# Patient Record
Sex: MALE | Race: WHITE | Employment: OTHER | URBAN - METROPOLITAN AREA
[De-identification: names, ages, dates, MRNs, and addresses within clinical notes are randomized per-mention and may not be internally consistent; named-entity substitution may affect disease eponyms.]

---

## 2018-09-11 ENCOUNTER — HOSPITAL ENCOUNTER (OUTPATIENT)
Dept: SURGERY | Age: 73
Discharge: HOME | End: 2018-09-11
Payer: MEDICARE

## 2018-09-11 ENCOUNTER — HOSPITAL ENCOUNTER (OUTPATIENT)
Dept: PHYSICAL THERAPY | Age: 73
Discharge: HOME | End: 2018-09-11
Payer: MEDICARE

## 2018-09-11 LAB
ALBUMIN SERPL-MCNC: 3.7 G/DL (ref 3.2–4.6)
ANION GAP SERPL CALC-SCNC: 7 MMOL/L
APPEARANCE UR: CLEAR
APTT PPP: 29.8 SEC (ref 23.2–35.3)
ATRIAL RATE: 60 BPM
BACTERIA SPEC CULT: NORMAL
BASOPHILS # BLD: 0 K/UL (ref 0–0.2)
BASOPHILS NFR BLD: 0 % (ref 0–2)
BILIRUB UR QL: NEGATIVE
BUN SERPL-MCNC: 16 MG/DL (ref 8–23)
CALCIUM SERPL-MCNC: 9.4 MG/DL (ref 8.3–10.4)
CALCULATED P AXIS, ECG09: 56 DEGREES
CALCULATED R AXIS, ECG10: 24 DEGREES
CALCULATED T AXIS, ECG11: 2 DEGREES
CHLORIDE SERPL-SCNC: 101 MMOL/L (ref 98–107)
CO2 SERPL-SCNC: 32 MMOL/L (ref 21–32)
COLOR UR: YELLOW
CREAT SERPL-MCNC: 0.88 MG/DL (ref 0.8–1.5)
DIAGNOSIS, 93000: NORMAL
DIFFERENTIAL METHOD BLD: NORMAL
EOSINOPHIL # BLD: 0.2 K/UL (ref 0–0.8)
EOSINOPHIL NFR BLD: 3 % (ref 0.5–7.8)
ERYTHROCYTE [DISTWIDTH] IN BLOOD BY AUTOMATED COUNT: 12.9 %
EST. AVERAGE GLUCOSE BLD GHB EST-MCNC: 137 MG/DL
GLUCOSE SERPL-MCNC: 109 MG/DL (ref 65–100)
GLUCOSE UR STRIP.AUTO-MCNC: NEGATIVE MG/DL
HBA1C MFR BLD: 6.4 %
HCT VFR BLD AUTO: 43 % (ref 41.1–50.3)
HGB BLD-MCNC: 13.6 G/DL (ref 13.6–17.2)
HGB UR QL STRIP: NEGATIVE
IMM GRANULOCYTES # BLD: 0 K/UL (ref 0–0.5)
IMM GRANULOCYTES NFR BLD AUTO: 0 % (ref 0–5)
INR PPP: 1
KETONES UR QL STRIP.AUTO: ABNORMAL MG/DL
LEUKOCYTE ESTERASE UR QL STRIP.AUTO: NEGATIVE
LYMPHOCYTES # BLD: 1.5 K/UL (ref 0.5–4.6)
LYMPHOCYTES NFR BLD: 22 % (ref 13–44)
MCH RBC QN AUTO: 30 PG (ref 26.1–32.9)
MCHC RBC AUTO-ENTMCNC: 31.6 G/DL (ref 31.4–35)
MCV RBC AUTO: 94.7 FL (ref 79.6–97.8)
MONOCYTES # BLD: 0.7 K/UL (ref 0.1–1.3)
MONOCYTES NFR BLD: 10 % (ref 4–12)
NEUTS SEG # BLD: 4.5 K/UL (ref 1.7–8.2)
NEUTS SEG NFR BLD: 65 % (ref 43–78)
NITRITE UR QL STRIP.AUTO: NEGATIVE
NRBC # BLD: 0 K/UL (ref 0–0.2)
P-R INTERVAL, ECG05: 178 MS
PH UR STRIP: 5.5 [PH] (ref 5–9)
PLATELET # BLD AUTO: 168 K/UL (ref 150–450)
PMV BLD AUTO: 10.1 FL (ref 9.4–12.3)
POTASSIUM SERPL-SCNC: 4.1 MMOL/L (ref 3.5–5.1)
PROT UR STRIP-MCNC: NEGATIVE MG/DL
PROTHROMBIN TIME: 12.8 SEC (ref 11.5–14.5)
Q-T INTERVAL, ECG07: 442 MS
QRS DURATION, ECG06: 158 MS
QTC CALCULATION (BEZET), ECG08: 442 MS
RBC # BLD AUTO: 4.54 M/UL (ref 4.23–5.6)
SERVICE CMNT-IMP: NORMAL
SODIUM SERPL-SCNC: 140 MMOL/L (ref 136–145)
SP GR UR REFRACTOMETRY: 1.03 (ref 1–1.02)
UROBILINOGEN UR QL STRIP.AUTO: 0.2 EU/DL (ref 0.2–1)
VENTRICULAR RATE, ECG03: 60 BPM
WBC # BLD AUTO: 7 K/UL (ref 4.3–11.1)

## 2018-09-11 PROCEDURE — G8979 MOBILITY GOAL STATUS: HCPCS

## 2018-09-11 PROCEDURE — 81003 URINALYSIS AUTO W/O SCOPE: CPT

## 2018-09-11 PROCEDURE — 83036 HEMOGLOBIN GLYCOSYLATED A1C: CPT

## 2018-09-11 PROCEDURE — 85730 THROMBOPLASTIN TIME PARTIAL: CPT

## 2018-09-11 PROCEDURE — 87641 MR-STAPH DNA AMP PROBE: CPT

## 2018-09-11 PROCEDURE — 85610 PROTHROMBIN TIME: CPT

## 2018-09-11 PROCEDURE — 97161 PT EVAL LOW COMPLEX 20 MIN: CPT

## 2018-09-11 PROCEDURE — 82040 ASSAY OF SERUM ALBUMIN: CPT

## 2018-09-11 PROCEDURE — G8978 MOBILITY CURRENT STATUS: HCPCS

## 2018-09-11 PROCEDURE — 36415 COLL VENOUS BLD VENIPUNCTURE: CPT

## 2018-09-11 PROCEDURE — 77030027138 HC INCENT SPIROMETER -A

## 2018-09-11 PROCEDURE — 80048 BASIC METABOLIC PNL TOTAL CA: CPT

## 2018-09-11 PROCEDURE — 93005 ELECTROCARDIOGRAM TRACING: CPT | Performed by: ORTHOPAEDIC SURGERY

## 2018-09-11 PROCEDURE — 85025 COMPLETE CBC W/AUTO DIFF WBC: CPT

## 2018-09-11 PROCEDURE — 80307 DRUG TEST PRSMV CHEM ANLYZR: CPT

## 2018-09-11 PROCEDURE — G8980 MOBILITY D/C STATUS: HCPCS

## 2018-09-11 RX ORDER — PRAVASTATIN SODIUM 40 MG/1
40 TABLET ORAL
COMMUNITY

## 2018-09-11 RX ORDER — HYDROCODONE BITARTRATE AND ACETAMINOPHEN 10; 325 MG/1; MG/1
1 TABLET ORAL 3 TIMES DAILY
Status: ON HOLD | COMMUNITY
End: 2018-10-04

## 2018-09-11 RX ORDER — CELECOXIB 200 MG/1
200 CAPSULE ORAL DAILY
COMMUNITY

## 2018-09-11 RX ORDER — MULTIVITAMIN
1 TABLET ORAL DAILY
COMMUNITY

## 2018-09-11 RX ORDER — LANOLIN ALCOHOL/MO/W.PET/CERES
1000 CREAM (GRAM) TOPICAL DAILY
COMMUNITY

## 2018-09-11 RX ORDER — RANITIDINE 150 MG/1
150 CAPSULE ORAL 2 TIMES DAILY
COMMUNITY

## 2018-09-11 RX ORDER — GABAPENTIN 600 MG/1
600 TABLET ORAL 3 TIMES DAILY
COMMUNITY

## 2018-09-11 RX ORDER — IBUPROFEN 200 MG
TABLET ORAL
COMMUNITY
End: 2018-10-05

## 2018-09-11 RX ORDER — METFORMIN HYDROCHLORIDE 500 MG/1
TABLET, FILM COATED, EXTENDED RELEASE ORAL DAILY
COMMUNITY

## 2018-09-11 RX ORDER — DOCUSATE SODIUM 100 MG/1
100 CAPSULE, LIQUID FILLED ORAL
COMMUNITY

## 2018-09-11 NOTE — PROGRESS NOTES
Pati Jackson : [birth year](78 y.o.) [address] at [place] [address]. [phone] Physical Therapy Prehab Plan of Treatment and Evaluation Summary:2018 ICD-10: Treatment Diagnosis:  
· Pain in left hip (M25.552) · Stiffness of Left Hip, Not elsewhere classified (M25.652) Precautions/Allergies:  
Review of patient's allergies indicates no known allergies. MEDICAL/REFERRING DIAGNOSIS: 
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 
DATE OF SURGERY: 10/3/18 Assessment:  
Comments:  Pain in left hip joint with decreased independence with functional mobility. Pt plans to pursue rehab following hospital stay. Pt's wife and daughter present. Pt's wife will be recovering from surgery as well as pt. PROBLEM LIST (Impacting functional limitations): 
Mr. Fabián Ramirez presents with the following left lower extremity(s) problems: 1. Transfers 2. Gait 3. Strength 4. Range of Motion 5. Pain INTERVENTIONS PLANNED: 
1. Home Exercise Program 
2. Educational Discussion TREATMENT PLAN: Effective Dates: 2018 TO 2018. Frequency/Duration: Patient to continue to perform home exercise program at least twice per day up until his surgery. GOALS: (Goals have been discussed and agreed upon with patient.) Discharge Goals: Time Frame: 1 Day 1. Patient will demonstrate independence with a home exercise program designed to increase strength, range of motion and pain control to minimize functional deficits and optimize patient for total joint replacement. Rehabilitation Potential For Stated Goals: Good Regarding Zeke Escalante's therapy, I certify that the treatment plan above will be carried out by a therapist or under their direction. Thank you for this referral, Yuliet Roca, PT     
    
 
 
HISTORY:  
Present Symptoms: 
Pain Intensity 1: 8 Pain Location 1: Hip Pain Orientation 1: Left History of Present Injury/Illness (Reason for Referral): 
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  
Mr. Feroz Hough  has a past medical history of Cancer ([facility] [address] Chronic pain; Constipation; DDD (degenerative disc disease), lumbar; Diabetes ([facility] [address] GERD (gastroesophageal reflux disease); Hypercholesteremia; Kidney stones; Nausea & vomiting; Scoliosis; and Spinal stenosis. He also has no past medical history of Adverse effect of anesthesia; Difficult intubation; Malignant hyperthermia due to anesthesia; or Pseudocholinesterase deficiency. Mr. Feroz Hough  has a past surgical history that includes hx hernia repair; hx urological; hx other surgical; and hx other surgical. 
Social History/Living Environment:  
Home Environment: Private residence # Steps to Enter: 6 One/Two Story Residence: One story Living Alone: No 
Support Systems: Spouse/Significant Other/Partner Patient Expects to be Discharged to[de-identified] Private residence Current DME Used/Available at Home: Cane, straight, Walker, rolling Tub or Shower Type: Shower Work/Activity: 
retired Dominant Side: 
RIGHT Current Medications:  See Pre-assessment nursing note Number of Personal Factors/Comorbidities that affect the Plan of Care: 0: LOW COMPLEXITY EXAMINATION:  
ADLs (Current Functional Status):  
Ambulation: 
[] Independent 
[] Walk Indoors Only 
[] Walk Outdoors [x] Use Assistive Device cane and wheelchair 
[] Use Wheelchair Only Dressing: 
[x] Independent Requires Assistance from Someone for: 
[] Sock/Shoes 
[] Pants 
[] Everything Bathing/Showering:  
[x] Independent 
[] Requires Assistance from Someone 
[] Sponge Bath Only Household Activities: 
[] Routine house and yard work [x] Light Housework Only 
[] None Observation/Orthostatic Postural Assessment:  
Within defined limits ROM/Flexibility:  
AROM: Generally decreased, functional 
 
  
  
  
  
LLE AROM L Hip Flexion: 90   
 
RLE Assessment RLE Assessment (WDL): Within defined limits Strength:  
Strength: Generally decreased, functional 
 
  
    
 
   
Functional Mobility:   
Coordination: Generally decreased, functional 
 
Gait Description (WDL): Within defined limits Stand to Sit: Independent Sit to Stand: Independent Distance (ft): 0 Feet (ft) Gait Abnormalities: Antalgic Balance:   
Sitting: Intact Standing: Intact Body Structures Involved: 1. Bones 2. Joints 3. Muscles 4. Ligaments Body Functions Affected: 1. Movement Related 2. Skin Related Activities and Participation Affected: 1. Mobility Number of elements that affect the Plan of Care: 4+: HIGH COMPLEXITY CLINICAL PRESENTATION:  
Presentation: Stable and uncomplicated: LOW COMPLEXITY CLINICAL DECISION MAKING:  
Outcome Measure: Tool Used: Lower Extremity Functional Scale (LEFS) Score:  Initial: 10/80 Most Recent: X/80 (Date: -- ) Interpretation of Score: 20 questions each scored on a 5 point scale with 0 representing \"extreme difficulty or unable to perform\" and 4 representing \"no difficulty\". The lower the score, the greater the functional disability. 80/80 represents no disability. Minimal detectable change is 9 points. Score 80 79-65 64-49 48-33 32-17 16-1 0 Modifier CH CI CJ CK CL CM CN  
 
? Mobility - Walking and Moving Around:  
  - CURRENT STATUS: CM - 80%-99% impaired, limited or restricted  - GOAL STATUS: CM - 80%-99% impaired, limited or restricted  - D/C STATUS:  CM - 80%-99% impaired, limited or restricted Medical Necessity:  
· Mr. Tay Polo is expected to optimize his lower extremity strength and ROM in preparation for joint replacement surgery. Reason for Services/Other Comments: · Achieve baseline assesment of musculoskeletal system, functional mobility and home environment. , educate in PT HEP in preparation for surgery, educate in hospital plan of care. Use of outcome tool(s) and clinical judgement create a POC that gives a: Clear prediction of patient's progress: LOW COMPLEXITY  
TREATMENT:  
Treatment/Session Assessment:  Patient was instructed in PT- HEP to increase strength and ROM in LEs. Answered all questions. · Post session pain:  8 
· Compliance with Program/Exercises: compliant most of the time. Total Treatment Duration: PT Patient Time In/Time Out Time In: 1230 Time Out: 1300 Jazmín Manning PT

## 2018-09-11 NOTE — PERIOP NOTES
Recent Results (from the past 12 hour(s)) CBC WITH AUTOMATED DIFF Collection Time: 09/11/18 12:35 PM  
Result Value Ref Range WBC 7.0 4.3 - 11.1 K/uL  
 RBC 4.54 4.23 - 5.6 M/uL  
 HGB 13.6 13.6 - 17.2 g/dL HCT 43.0 41.1 - 50.3 % MCV 94.7 79.6 - 97.8 FL  
 MCH 30.0 26.1 - 32.9 PG  
 MCHC 31.6 31.4 - 35.0 g/dL  
 RDW 12.9 % PLATELET 333 470 - 411 K/uL MPV 10.1 9.4 - 12.3 FL ABSOLUTE NRBC 0.00 0.0 - 0.2 K/uL  
 DF AUTOMATED NEUTROPHILS 65 43 - 78 % LYMPHOCYTES 22 13 - 44 % MONOCYTES 10 4.0 - 12.0 % EOSINOPHILS 3 0.5 - 7.8 % BASOPHILS 0 0.0 - 2.0 % IMMATURE GRANULOCYTES 0 0.0 - 5.0 %  
 ABS. NEUTROPHILS 4.5 1.7 - 8.2 K/UL  
 ABS. LYMPHOCYTES 1.5 0.5 - 4.6 K/UL  
 ABS. MONOCYTES 0.7 0.1 - 1.3 K/UL  
 ABS. EOSINOPHILS 0.2 0.0 - 0.8 K/UL  
 ABS. BASOPHILS 0.0 0.0 - 0.2 K/UL  
 ABS. IMM. GRANS. 0.0 0.0 - 0.5 K/UL PROTHROMBIN TIME + INR Collection Time: 09/11/18 12:35 PM  
Result Value Ref Range Prothrombin time 12.8 11.5 - 14.5 sec INR 1.0    
PTT Collection Time: 09/11/18 12:35 PM  
Result Value Ref Range aPTT 29.8 23.2 - 35.3 SEC METABOLIC PANEL, BASIC Collection Time: 09/11/18 12:35 PM  
Result Value Ref Range Sodium 140 136 - 145 mmol/L Potassium 4.1 3.5 - 5.1 mmol/L Chloride 101 98 - 107 mmol/L  
 CO2 32 21 - 32 mmol/L Anion gap 7 mmol/L Glucose 109 (H) 65 - 100 mg/dL BUN 16 8 - 23 MG/DL Creatinine 0.88 0.8 - 1.5 MG/DL  
 GFR est AA >60 >60 ml/min/1.73m2 GFR est non-AA >60 ml/min/1.73m2 Calcium 9.4 8.3 - 10.4 MG/DL URINALYSIS W/ RFLX MICROSCOPIC Collection Time: 09/11/18 12:35 PM  
Result Value Ref Range Color YELLOW Appearance CLEAR Specific gravity 1.026 (H) 1.001 - 1.023    
 pH (UA) 5.5 5.0 - 9.0 Protein NEGATIVE  NEG mg/dL Glucose NEGATIVE  mg/dL Ketone TRACE (A) NEG mg/dL Bilirubin NEGATIVE  NEG Blood NEGATIVE  NEG Urobilinogen 0.2 0.2 - 1.0 EU/dL  Nitrites NEGATIVE  NEG    
 Leukocyte Esterase NEGATIVE  NEG    
ALBUMIN Collection Time: 09/11/18 12:35 PM  
Result Value Ref Range Albumin 3.7 3.2 - 4.6 g/dL HEMOGLOBIN A1C WITH EAG Collection Time: 09/11/18 12:35 PM  
Result Value Ref Range Hemoglobin A1c 6.4 % Est. average glucose 137 mg/dL EKG, 12 LEAD, INITIAL Collection Time: 09/11/18 12:46 PM  
Result Value Ref Range Ventricular Rate 60 BPM  
 Atrial Rate 60 BPM  
 P-R Interval 178 ms QRS Duration 158 ms Q-T Interval 442 ms QTC Calculation (Bezet) 442 ms Calculated P Axis 56 degrees Calculated R Axis 24 degrees Calculated T Axis 2 degrees Diagnosis Normal sinus rhythm Right bundle branch block Abnormal ECG No previous ECGs available

## 2018-09-11 NOTE — PERIOP NOTES
Patient verified name, , and surgery as listed in [facility]. Pt reports very loose bottom front tooth, states no pain or signs of infection:pt is asking whether or not to pull tooth prior to surgery on 10-3-18. I notified Dr Eugenio Valencia, anesthesiologist of above and that pt concerned he will not be able to have spinal anesthesia r/t back problems and may need general.  Dr Eugenio Valencia reports he will probably be able to have spinal but to notify surgeon for recommendation. Called and left voice message for Allurent at Dr Sania Ayala office. Type 3 surgery, Joint assessment complete. Labs per surgeon: cbc,bmp,pt,ptt,ua ; results within anathesia limits. Labs per anesthesia protocol: Included in surgeon's orders. EKG: today - within anesthesia limits. Hibiclens and instructions to return bottle on DOS given per hospital policy. Nasal Swab collected per MD order and instructions for Mupirocin nasal ointment if required. Patient provided with handouts including Guide to Surgery, Pain Management, Hand Hygiene, Blood Transfusion Education, and [facility] Brochure. Patient answered medical/surgical history questions at their best of ability. All prior to admission medications documented in [facility]. Original medication prescription bottle  visualized during patient appointment. Patient instructed to hold all vitamins 7 days prior to surgery and NSAIDS 5 days prior to surgery. Medications to be held: ibuprofen- 5 days. Patient instructed to continue previous medications as prescribed prior to surgery and to take the following medications the day of surgery according to anesthesia guidelines with a small sip of water: oxycodone-acetaminophen, gabapentin, ranitidine. Brandan Herron Patient teach back successful and patient demonstrates knowledge of instruction.

## 2018-09-11 NOTE — PERIOP NOTES
Recent Results (from the past 12 hour(s)) CBC WITH AUTOMATED DIFF Collection Time: 09/11/18 12:35 PM  
Result Value Ref Range WBC 7.0 4.3 - 11.1 K/uL  
 RBC 4.54 4.23 - 5.6 M/uL  
 HGB 13.6 13.6 - 17.2 g/dL HCT 43.0 41.1 - 50.3 % MCV 94.7 79.6 - 97.8 FL  
 MCH 30.0 26.1 - 32.9 PG  
 MCHC 31.6 31.4 - 35.0 g/dL  
 RDW 12.9 % PLATELET 187 911 - 967 K/uL MPV 10.1 9.4 - 12.3 FL ABSOLUTE NRBC 0.00 0.0 - 0.2 K/uL  
 DF AUTOMATED NEUTROPHILS 65 43 - 78 % LYMPHOCYTES 22 13 - 44 % MONOCYTES 10 4.0 - 12.0 % EOSINOPHILS 3 0.5 - 7.8 % BASOPHILS 0 0.0 - 2.0 % IMMATURE GRANULOCYTES 0 0.0 - 5.0 %  
 ABS. NEUTROPHILS 4.5 1.7 - 8.2 K/UL  
 ABS. LYMPHOCYTES 1.5 0.5 - 4.6 K/UL  
 ABS. MONOCYTES 0.7 0.1 - 1.3 K/UL  
 ABS. EOSINOPHILS 0.2 0.0 - 0.8 K/UL  
 ABS. BASOPHILS 0.0 0.0 - 0.2 K/UL  
 ABS. IMM. GRANS. 0.0 0.0 - 0.5 K/UL PROTHROMBIN TIME + INR Collection Time: 09/11/18 12:35 PM  
Result Value Ref Range Prothrombin time 12.8 11.5 - 14.5 sec INR 1.0    
PTT Collection Time: 09/11/18 12:35 PM  
Result Value Ref Range aPTT 29.8 23.2 - 35.3 SEC METABOLIC PANEL, BASIC Collection Time: 09/11/18 12:35 PM  
Result Value Ref Range Sodium 140 136 - 145 mmol/L Potassium 4.1 3.5 - 5.1 mmol/L Chloride 101 98 - 107 mmol/L  
 CO2 32 21 - 32 mmol/L Anion gap 7 mmol/L Glucose 109 (H) 65 - 100 mg/dL BUN 16 8 - 23 MG/DL Creatinine 0.88 0.8 - 1.5 MG/DL  
 GFR est AA >60 >60 ml/min/1.73m2 GFR est non-AA >60 ml/min/1.73m2 Calcium 9.4 8.3 - 10.4 MG/DL URINALYSIS W/ RFLX MICROSCOPIC Collection Time: 09/11/18 12:35 PM  
Result Value Ref Range Color YELLOW Appearance CLEAR Specific gravity 1.026 (H) 1.001 - 1.023    
 pH (UA) 5.5 5.0 - 9.0 Protein NEGATIVE  NEG mg/dL Glucose NEGATIVE  mg/dL Ketone TRACE (A) NEG mg/dL Bilirubin NEGATIVE  NEG Blood NEGATIVE  NEG Urobilinogen 0.2 0.2 - 1.0 EU/dL  Nitrites NEGATIVE  NEG    
 Leukocyte Esterase NEGATIVE  NEG    
ALBUMIN Collection Time: 09/11/18 12:35 PM  
Result Value Ref Range Albumin 3.7 3.2 - 4.6 g/dL HEMOGLOBIN A1C WITH EAG Collection Time: 09/11/18 12:35 PM  
Result Value Ref Range Hemoglobin A1c 6.4 % Est. average glucose 137 mg/dL EKG, 12 LEAD, INITIAL Collection Time: 09/11/18 12:46 PM  
Result Value Ref Range Ventricular Rate 60 BPM  
 Atrial Rate 60 BPM  
 P-R Interval 178 ms QRS Duration 158 ms Q-T Interval 442 ms QTC Calculation (Bezet) 442 ms Calculated P Axis 56 degrees Calculated R Axis 24 degrees Calculated T Axis 2 degrees Diagnosis Normal sinus rhythm Right bundle branch block Abnormal ECG No previous ECGs available

## 2018-09-12 VITALS
BODY MASS INDEX: 23.95 KG/M2 | TEMPERATURE: 97.6 F | HEIGHT: 66 IN | SYSTOLIC BLOOD PRESSURE: 145 MMHG | HEART RATE: 75 BPM | DIASTOLIC BLOOD PRESSURE: 75 MMHG | RESPIRATION RATE: 18 BRPM | WEIGHT: 149 LBS | OXYGEN SATURATION: 95 %

## 2018-09-12 PROBLEM — R06.83 SNORING: Status: ACTIVE | Noted: 2018-09-12

## 2018-09-12 LAB
COTININE UR QL SCN: NEGATIVE NG/ML
DRUG SCREEN COMMENT:, 753798: NORMAL

## 2018-09-12 NOTE — ADVANCED PRACTICE NURSE
Total Joint Surgery Preoperative Chart Review Patient ID: 
Camie Jeans [id number] 
61 y.o. 
1945 Surgeon: Dr. Arya Goodrich Date of Surgery: 10/3/2018 Procedure: Total Left Hip Arthroplasty Primary Care Physician: Not On File Bshsi None Specialty Physician(s):   
 
Subjective:  
Camie Jeans is a 74 y.o. WHITE OR  male who presents for preoperative evaluation for Total Left Hip arthroplasty. This is a preoperative chart review note based on data collected by the nurse at the surgical Pre-Assessment visit. Past Medical History:  
Diagnosis Date  Cancer ([facility] [address] bladder  Chronic pain   
 back, hip  Constipation  DDD (degenerative disc disease), lumbar  Diabetes ([facility] [address]   
 pt reports borderline-does not check FBS  GERD (gastroesophageal reflux disease)   
 controlled with med  Hypercholesteremia  Kidney stones  Nausea & vomiting   
 request preop med  Scoliosis  Spinal stenosis Past Surgical History:  
Procedure Laterality Date  HX HERNIA REPAIR    
 HX OTHER SURGICAL    
 pilonidal cyst removal  
 HX OTHER SURGICAL    
 facial reconstruction  HX UROLOGICAL    
 bladder History reviewed. No pertinent family history. Social History Substance Use Topics  Smoking status: Former Smoker  Smokeless tobacco: Never Used  Alcohol use No  
   
Prior to Admission medications Medication Sig Start Date End Date Taking? Authorizing Provider HYDROcodone-acetaminophen (NORCO)  mg tablet Take 1 Tab by mouth three (3) times daily. Indications: Pain   Yes Historical Provider  
raNITIdine hcl 150 mg capsule Take 150 mg by mouth two (2) times a day. Indications: gastroesophageal reflux disease   Yes Historical Provider  
celecoxib (CELEBREX) 200 mg capsule Take 200 mg by mouth daily.  Indications: OSTEOARTHRITIS   Yes Historical Provider  
oxyCODONE ER (XTAMPZA ER) 9 mg capsule Take 9 mg by mouth every twelve (12) hours. Indications: Severe Pain   Yes Historical Provider  
gabapentin (NEURONTIN) 600 mg tablet Take 600 mg by mouth three (3) times daily. 1 tablet AM and mid-day, 2 tablets at night   Indications: pain   Yes Historical Provider  
metFORMIN (GLUMETZA ER) 500 mg TG24 24 hour tablet Take  by mouth daily. Indications: PREVENTION OF TYPE 2 DIABETES MELLITUS   Yes Historical Provider  
pravastatin (PRAVACHOL) 40 mg tablet Take 40 mg by mouth nightly. Indications: hypercholesterolemia   Yes Historical Provider  
ibuprofen (MOTRIN) 200 mg tablet Take  by mouth every six (6) hours as needed for Pain. Stop 5 days prior to surgery per anesthesia guidelines. Yes Historical Provider  
docusate sodium (COLACE) 100 mg capsule Take 100 mg by mouth nightly. Indications: constipation   Yes Historical Provider  
cyanocobalamin (VITAMIN B-12) 1,000 mcg tablet Take 1,000 mcg by mouth daily. Yes Historical Provider  
calcium-cholecalciferol, D3, (CALTRATE 600+D) tablet Take 1 Tab by mouth daily. Yes Historical Provider No Known Allergies Objective:  
 
Physical Exam:  
Patient Vitals for the past 24 hrs: 
 Temp Pulse Resp BP SpO2  
09/11/18 1350 97.6 °F (36.4 °C) 75 18 145/75 95 % ECG:   
EKG Results Procedure 720 Value Units Date/Time EKG, 12 LEAD, INITIAL [[id number]] Collected:  09/11/18 1246 Order Status:  Completed Updated:  09/11/18 1744 Ventricular Rate 60 BPM   
  Atrial Rate 60 BPM   
  P-R Interval 178 ms QRS Duration 158 ms Q-T Interval 442 ms QTC Calculation (Bezet) 442 ms Calculated P Axis 56 degrees Calculated R Axis 24 degrees Calculated T Axis 2 degrees Diagnosis --  
  Normal sinus rhythm Right bundle branch block Abnormal ECG No previous ECGs available Confirmed by CHETAN CADET (), Jun Glass ([id number]) on 9/11/2018 5:44:44 PM 
  
  
 
 
Data Review:  
Labs:  
Recent Results (from the past 24 hour(s)) CBC WITH AUTOMATED DIFF  
 Collection Time: 09/11/18 12:35 PM  
Result Value Ref Range WBC 7.0 4.3 - 11.1 K/uL  
 RBC 4.54 4.23 - 5.6 M/uL  
 HGB 13.6 13.6 - 17.2 g/dL HCT 43.0 41.1 - 50.3 % MCV 94.7 79.6 - 97.8 FL  
 MCH 30.0 26.1 - 32.9 PG  
 MCHC 31.6 31.4 - 35.0 g/dL  
 RDW 12.9 % PLATELET 007 820 - 151 K/uL MPV 10.1 9.4 - 12.3 FL ABSOLUTE NRBC 0.00 0.0 - 0.2 K/uL  
 DF AUTOMATED NEUTROPHILS 65 43 - 78 % LYMPHOCYTES 22 13 - 44 % MONOCYTES 10 4.0 - 12.0 % EOSINOPHILS 3 0.5 - 7.8 % BASOPHILS 0 0.0 - 2.0 % IMMATURE GRANULOCYTES 0 0.0 - 5.0 %  
 ABS. NEUTROPHILS 4.5 1.7 - 8.2 K/UL  
 ABS. LYMPHOCYTES 1.5 0.5 - 4.6 K/UL  
 ABS. MONOCYTES 0.7 0.1 - 1.3 K/UL  
 ABS. EOSINOPHILS 0.2 0.0 - 0.8 K/UL  
 ABS. BASOPHILS 0.0 0.0 - 0.2 K/UL  
 ABS. IMM. GRANS. 0.0 0.0 - 0.5 K/UL PROTHROMBIN TIME + INR Collection Time: 09/11/18 12:35 PM  
Result Value Ref Range Prothrombin time 12.8 11.5 - 14.5 sec INR 1.0    
PTT Collection Time: 09/11/18 12:35 PM  
Result Value Ref Range aPTT 29.8 23.2 - 35.3 SEC METABOLIC PANEL, BASIC Collection Time: 09/11/18 12:35 PM  
Result Value Ref Range Sodium 140 136 - 145 mmol/L Potassium 4.1 3.5 - 5.1 mmol/L Chloride 101 98 - 107 mmol/L  
 CO2 32 21 - 32 mmol/L Anion gap 7 mmol/L Glucose 109 (H) 65 - 100 mg/dL BUN 16 8 - 23 MG/DL Creatinine 0.88 0.8 - 1.5 MG/DL  
 GFR est AA >60 >60 ml/min/1.73m2 GFR est non-AA >60 ml/min/1.73m2 Calcium 9.4 8.3 - 10.4 MG/DL URINALYSIS W/ RFLX MICROSCOPIC Collection Time: 09/11/18 12:35 PM  
Result Value Ref Range Color YELLOW Appearance CLEAR Specific gravity 1.026 (H) 1.001 - 1.023    
 pH (UA) 5.5 5.0 - 9.0 Protein NEGATIVE  NEG mg/dL Glucose NEGATIVE  mg/dL Ketone TRACE (A) NEG mg/dL Bilirubin NEGATIVE  NEG Blood NEGATIVE  NEG Urobilinogen 0.2 0.2 - 1.0 EU/dL Nitrites NEGATIVE  NEG  Leukocyte Esterase NEGATIVE  NEG    
ALBUMIN  
 Collection Time: 09/11/18 12:35 PM  
Result Value Ref Range Albumin 3.7 3.2 - 4.6 g/dL HEMOGLOBIN A1C WITH EAG Collection Time: 09/11/18 12:35 PM  
Result Value Ref Range Hemoglobin A1c 6.4 % Est. average glucose 137 mg/dL EKG, 12 LEAD, INITIAL Collection Time: 09/11/18 12:46 PM  
Result Value Ref Range Ventricular Rate 60 BPM  
 Atrial Rate 60 BPM  
 P-R Interval 178 ms QRS Duration 158 ms Q-T Interval 442 ms QTC Calculation (Bezet) 442 ms Calculated P Axis 56 degrees Calculated R Axis 24 degrees Calculated T Axis 2 degrees Diagnosis Normal sinus rhythm Right bundle branch block Abnormal ECG No previous ECGs available Confirmed by CHETAN CADET (), Veda Maloney ([id number]) on 9/11/2018 5:44:44 PM 
  
MSSA/MRSA SC BY PCR, NASAL SWAB Collection Time: 09/11/18  1:26 PM  
Result Value Ref Range Special Requests: NARES Culture result:     
  SA target not detected. A MRSA NEGATIVE, SA NEGATIVE test result does not preclude MRSA or SA nasal colonization. Problem List: 
) Patient Active Problem List  
Diagnosis Code  Snoring R06.83 Total Joint Surgery Pre-Assessment Recommendations:          
He/she is a moderate risk for sleep apnea but refuses to have additional work up at this time. Will initiate perioperative TOSHA precautions. Recommend continuous saturation monitoring hours of sleep, during hospitalization. Signed By: EDWIGE Escalona September 12, 2018

## 2018-09-12 NOTE — PROGRESS NOTES
09/11/18 1200 Oxygen Therapy O2 Sat (%) 95 % Pulse via Oximetry 69 beats per minute O2 Device Room air Pre-Treatment Breath Sounds Bilateral Diminished Pre FEV1 (liters) 1.6 liters % Predicted 63 Incentive Spirometry Treatment Actual Volume (ml) 1500 ml Initial respiratory Assessment completed with pt. Pt was interviewed and evaluated in Joint camp prior to surgery. Patient ID: 
Caroline Babin [id number] 
27 y.o. 
1945 Surgeon: Dr. Celso Vasquez Date of Surgery: 10/3/2018 Procedure: Total Left Hip Arthroplasty Primary Care Physician: Not On File Bshsi None Specialists:  
                    
          Pt instructed in the use of Incentive Spirometry. Pt instructed to bring Incentive Spirometer back on date of surgery & to start using Is upon return to pt room. Pt taught proper cough technique History of smoking:   FORMER 1-2 PPD FOR 35 YEARS Quit date:   80 Secondhand smoke: 
 
 
Past procedures with Oxygen desaturation:NONE Past Medical History:  
Diagnosis Date  Cancer ([address] bladder  Chronic pain   
 back, hip  Constipation  DDD (degenerative disc disease), lumbar  Diabetes ([address]   
 pt reports borderline-does not check FBS  GERD (gastroesophageal reflux disease)   
 controlled with med  Hypercholesteremia  Kidney stones  Nausea & vomiting   
 request preop med  Scoliosis  Spinal stenosis Respiratory history:DENIES SOB Respiratory meds: FAMILY PRESENT:            SPOUSE,      
                                          AND DAUGHTER 
                                   
PAST SLEEP STUDY:                     NO 
 HX OF TOSHA:                                        NO TOSHA assessment: SLEEPS ON SIDE      THEN BACK PHYSICAL EXAM  
Body mass index is 24.05 kg/(m^2). Visit Vitals  /75 (BP 1 Location: Left arm, BP Patient Position: At rest;Sitting)  Pulse 75  Temp 97.6 °F (36.4 °C)  Resp 18  Ht 5' 6\" (1.676 m)  Wt 67.6 kg (149 lb)  SpO2 95%  BMI 24.05 kg/m2 Neck circumference: 42     cm Loud snoring:        YES Witnessed apnea or wakening gasping or choking:,             
                                                                                              APNEA Awakens with headaches:                                                  DENIES Morning or daytime tiredness/ sleepiness:                 NAPS TWICE A DAY - TAKING OPIATES 
                                                                                     TIRED Dry mouth or sore throat in morning:                YES Soliman stage:  4 SACS score:30 STOP/BAN 
 
                         
CPAP:                       NONE 
                                  
 
 
 
 
     CONT SAT HS Referrals: DECLINED HST Pt. Phone Number:

## 2018-09-13 NOTE — PERIOP NOTES
9/12/2018  6:35 PM - [facility], Lab In [place]  
Component Results Component Value Flag Ref Range Units Status Cotinine Screen, urine NEGATIVE   Nblany=013 ng/mL Final

## 2018-09-28 RX ORDER — SODIUM CHLORIDE 0.9 % (FLUSH) 0.9 %
5-10 SYRINGE (ML) INJECTION EVERY 8 HOURS
Status: CANCELLED | OUTPATIENT
Start: 2018-09-28

## 2018-10-02 ENCOUNTER — ANESTHESIA EVENT (OUTPATIENT)
Dept: SURGERY | Age: 73
DRG: 470 | End: 2018-10-02
Payer: MEDICARE

## 2018-10-03 ENCOUNTER — ANESTHESIA (OUTPATIENT)
Dept: SURGERY | Age: 73
DRG: 470 | End: 2018-10-03
Payer: MEDICARE

## 2018-10-03 ENCOUNTER — HOSPITAL ENCOUNTER (INPATIENT)
Age: 73
LOS: 2 days | Discharge: HOME HEALTH | DRG: 470 | End: 2018-10-05
Attending: ORTHOPAEDIC SURGERY | Admitting: ORTHOPAEDIC SURGERY
Payer: MEDICARE

## 2018-10-03 ENCOUNTER — APPOINTMENT (OUTPATIENT)
Dept: GENERAL RADIOLOGY | Age: 73
DRG: 470 | End: 2018-10-03
Attending: ORTHOPAEDIC SURGERY
Payer: MEDICARE

## 2018-10-03 DIAGNOSIS — M87.00 AVN (AVASCULAR NECROSIS OF BONE) (HCC): Primary | ICD-10-CM

## 2018-10-03 DIAGNOSIS — M16.12 PRIMARY OSTEOARTHRITIS OF LEFT HIP: ICD-10-CM

## 2018-10-03 PROBLEM — M16.9 OA (OSTEOARTHRITIS) OF HIP: Status: ACTIVE | Noted: 2018-10-03

## 2018-10-03 LAB
ABO + RH BLD: NORMAL
BLOOD GROUP ANTIBODIES SERPL: NORMAL
GLUCOSE BLD STRIP.AUTO-MCNC: 121 MG/DL (ref 65–100)
HGB BLD-MCNC: 11.3 G/DL (ref 13.6–17.2)
SPECIMEN EXP DATE BLD: NORMAL

## 2018-10-03 PROCEDURE — 94760 N-INVAS EAR/PLS OXIMETRY 1: CPT

## 2018-10-03 PROCEDURE — 77030019557 HC ELECTRD VES SEAL MEDT -F: Performed by: ORTHOPAEDIC SURGERY

## 2018-10-03 PROCEDURE — 74011000250 HC RX REV CODE- 250: Performed by: ORTHOPAEDIC SURGERY

## 2018-10-03 PROCEDURE — 77030018836 HC SOL IRR NACL ICUM -A: Performed by: ORTHOPAEDIC SURGERY

## 2018-10-03 PROCEDURE — C1776 JOINT DEVICE (IMPLANTABLE): HCPCS | Performed by: ORTHOPAEDIC SURGERY

## 2018-10-03 PROCEDURE — 77030002933 HC SUT MCRYL J&J -A: Performed by: ORTHOPAEDIC SURGERY

## 2018-10-03 PROCEDURE — 97530 THERAPEUTIC ACTIVITIES: CPT

## 2018-10-03 PROCEDURE — 77030020782 HC GWN BAIR PAWS FLX 3M -B: Performed by: ANESTHESIOLOGY

## 2018-10-03 PROCEDURE — 74011000250 HC RX REV CODE- 250

## 2018-10-03 PROCEDURE — 77030003666 HC NDL SPINAL BD -A: Performed by: ORTHOPAEDIC SURGERY

## 2018-10-03 PROCEDURE — 77030018074 HC RTVR SUT ARTH4 S&N -B: Performed by: ORTHOPAEDIC SURGERY

## 2018-10-03 PROCEDURE — 65270000029 HC RM PRIVATE

## 2018-10-03 PROCEDURE — 77030008477 HC STYL SATN SLP COVD -A: Performed by: ANESTHESIOLOGY

## 2018-10-03 PROCEDURE — 97165 OT EVAL LOW COMPLEX 30 MIN: CPT

## 2018-10-03 PROCEDURE — 77030039425 HC BLD LARYNG TRULITE DISP TELE -A: Performed by: ANESTHESIOLOGY

## 2018-10-03 PROCEDURE — 85018 HEMOGLOBIN: CPT

## 2018-10-03 PROCEDURE — 86901 BLOOD TYPING SEROLOGIC RH(D): CPT

## 2018-10-03 PROCEDURE — 0SRB04A REPLACEMENT OF LEFT HIP JOINT WITH CERAMIC ON POLYETHYLENE SYNTHETIC SUBSTITUTE, UNCEMENTED, OPEN APPROACH: ICD-10-PCS | Performed by: ORTHOPAEDIC SURGERY

## 2018-10-03 PROCEDURE — 36415 COLL VENOUS BLD VENIPUNCTURE: CPT

## 2018-10-03 PROCEDURE — 86580 TB INTRADERMAL TEST: CPT | Performed by: ORTHOPAEDIC SURGERY

## 2018-10-03 PROCEDURE — 77030019940 HC BLNKT HYPOTHRM STRY -B: Performed by: ANESTHESIOLOGY

## 2018-10-03 PROCEDURE — 77030020263 HC SOL INJ SOD CL0.9% LFCR 1000ML

## 2018-10-03 PROCEDURE — 77030034479 HC ADH SKN CLSR PRINEO J&J -B: Performed by: ORTHOPAEDIC SURGERY

## 2018-10-03 PROCEDURE — 74011250636 HC RX REV CODE- 250/636: Performed by: ANESTHESIOLOGY

## 2018-10-03 PROCEDURE — 74011000302 HC RX REV CODE- 302: Performed by: ORTHOPAEDIC SURGERY

## 2018-10-03 PROCEDURE — 77030034849: Performed by: ORTHOPAEDIC SURGERY

## 2018-10-03 PROCEDURE — 77030033067 HC SUT PDO STRATFX SPIR J&J -B: Performed by: ORTHOPAEDIC SURGERY

## 2018-10-03 PROCEDURE — 72170 X-RAY EXAM OF PELVIS: CPT

## 2018-10-03 PROCEDURE — 77030011266 HC ELECTRD BLD INSL COVD -A: Performed by: ORTHOPAEDIC SURGERY

## 2018-10-03 PROCEDURE — 77030008703 HC TU ET UNCUF COVD -A: Performed by: ANESTHESIOLOGY

## 2018-10-03 PROCEDURE — 77030006777 HC BLD SAW OSC CNMD -B: Performed by: ORTHOPAEDIC SURGERY

## 2018-10-03 PROCEDURE — 74011250636 HC RX REV CODE- 250/636: Performed by: ORTHOPAEDIC SURGERY

## 2018-10-03 PROCEDURE — 74011250636 HC RX REV CODE- 250/636

## 2018-10-03 PROCEDURE — 76210000006 HC OR PH I REC 0.5 TO 1 HR: Performed by: ORTHOPAEDIC SURGERY

## 2018-10-03 PROCEDURE — 99221 1ST HOSP IP/OBS SF/LOW 40: CPT | Performed by: PHYSICAL MEDICINE & REHABILITATION

## 2018-10-03 PROCEDURE — 76010000171 HC OR TIME 2 TO 2.5 HR INTENSV-TIER 1: Performed by: ORTHOPAEDIC SURGERY

## 2018-10-03 PROCEDURE — 77030002922 HC SUT FBRWRE ARTH -B: Performed by: ORTHOPAEDIC SURGERY

## 2018-10-03 PROCEDURE — 97161 PT EVAL LOW COMPLEX 20 MIN: CPT

## 2018-10-03 PROCEDURE — 74011250637 HC RX REV CODE- 250/637: Performed by: ORTHOPAEDIC SURGERY

## 2018-10-03 PROCEDURE — 77030033138 HC SUT PGA STRATFX J&J -B: Performed by: ORTHOPAEDIC SURGERY

## 2018-10-03 PROCEDURE — 77030013727 HC IRR FAN PULSVC ZIMM -B: Performed by: ORTHOPAEDIC SURGERY

## 2018-10-03 PROCEDURE — 82962 GLUCOSE BLOOD TEST: CPT

## 2018-10-03 PROCEDURE — 76060000035 HC ANESTHESIA 2 TO 2.5 HR: Performed by: ORTHOPAEDIC SURGERY

## 2018-10-03 PROCEDURE — 97110 THERAPEUTIC EXERCISES: CPT

## 2018-10-03 DEVICE — HEAD FEM DELT V40 +0MM NK 36MM -- V40 BIOLOX: Type: IMPLANTABLE DEVICE | Site: HIP | Status: FUNCTIONAL

## 2018-10-03 DEVICE — LINER ACET SZ E ID36MM THK5.9MM 10DEG X3 FOR 54-56MM: Type: IMPLANTABLE DEVICE | Site: HIP | Status: FUNCTIONAL

## 2018-10-03 DEVICE — STEM FEM SZ 7 L114MM NK L37MM 46MM OFFSET 132DEG HIP TI: Type: IMPLANTABLE DEVICE | Site: HIP | Status: FUNCTIONAL

## 2018-10-03 RX ORDER — OXYCODONE HYDROCHLORIDE 5 MG/1
5 TABLET ORAL
Status: DISCONTINUED | OUTPATIENT
Start: 2018-10-03 | End: 2018-10-05 | Stop reason: HOSPADM

## 2018-10-03 RX ORDER — SUCCINYLCHOLINE CHLORIDE 20 MG/ML
INJECTION INTRAMUSCULAR; INTRAVENOUS AS NEEDED
Status: DISCONTINUED | OUTPATIENT
Start: 2018-10-03 | End: 2018-10-03 | Stop reason: HOSPADM

## 2018-10-03 RX ORDER — HYDROMORPHONE HYDROCHLORIDE 2 MG/ML
INJECTION, SOLUTION INTRAMUSCULAR; INTRAVENOUS; SUBCUTANEOUS AS NEEDED
Status: DISCONTINUED | OUTPATIENT
Start: 2018-10-03 | End: 2018-10-03 | Stop reason: HOSPADM

## 2018-10-03 RX ORDER — GABAPENTIN 300 MG/1
600 CAPSULE ORAL 2 TIMES DAILY
Status: DISCONTINUED | OUTPATIENT
Start: 2018-10-04 | End: 2018-10-05 | Stop reason: HOSPADM

## 2018-10-03 RX ORDER — MIDAZOLAM HYDROCHLORIDE 1 MG/ML
2 INJECTION, SOLUTION INTRAMUSCULAR; INTRAVENOUS
Status: DISCONTINUED | OUTPATIENT
Start: 2018-10-03 | End: 2018-10-03 | Stop reason: HOSPADM

## 2018-10-03 RX ORDER — TRANEXAMIC ACID 100 MG/ML
INJECTION, SOLUTION INTRAVENOUS AS NEEDED
Status: DISCONTINUED | OUTPATIENT
Start: 2018-10-03 | End: 2018-10-03 | Stop reason: HOSPADM

## 2018-10-03 RX ORDER — FERROUS SULFATE, DRIED 160(50) MG
1 TABLET, EXTENDED RELEASE ORAL DAILY
Status: DISCONTINUED | OUTPATIENT
Start: 2018-10-04 | End: 2018-10-05 | Stop reason: HOSPADM

## 2018-10-03 RX ORDER — NEOMYCIN AND POLYMYXIN B SULFATES 40; 200000 MG/ML; [USP'U]/ML
SOLUTION IRRIGATION AS NEEDED
Status: DISCONTINUED | OUTPATIENT
Start: 2018-10-03 | End: 2018-10-03 | Stop reason: HOSPADM

## 2018-10-03 RX ORDER — LIDOCAINE HYDROCHLORIDE 10 MG/ML
0.1 INJECTION INFILTRATION; PERINEURAL AS NEEDED
Status: DISCONTINUED | OUTPATIENT
Start: 2018-10-03 | End: 2018-10-03 | Stop reason: HOSPADM

## 2018-10-03 RX ORDER — LANOLIN ALCOHOL/MO/W.PET/CERES
1000 CREAM (GRAM) TOPICAL DAILY
Status: DISCONTINUED | OUTPATIENT
Start: 2018-10-04 | End: 2018-10-05 | Stop reason: HOSPADM

## 2018-10-03 RX ORDER — METFORMIN HYDROCHLORIDE 500 MG/1
500 TABLET ORAL DAILY
Status: DISCONTINUED | OUTPATIENT
Start: 2018-10-04 | End: 2018-10-05 | Stop reason: HOSPADM

## 2018-10-03 RX ORDER — SODIUM CHLORIDE 0.9 % (FLUSH) 0.9 %
5-10 SYRINGE (ML) INJECTION AS NEEDED
Status: DISCONTINUED | OUTPATIENT
Start: 2018-10-03 | End: 2018-10-03 | Stop reason: HOSPADM

## 2018-10-03 RX ORDER — ACETAMINOPHEN 500 MG
500 TABLET ORAL ONCE
Status: DISCONTINUED | OUTPATIENT
Start: 2018-10-03 | End: 2018-10-03 | Stop reason: HOSPADM

## 2018-10-03 RX ORDER — KETOROLAC TROMETHAMINE 30 MG/ML
INJECTION, SOLUTION INTRAMUSCULAR; INTRAVENOUS AS NEEDED
Status: DISCONTINUED | OUTPATIENT
Start: 2018-10-03 | End: 2018-10-03 | Stop reason: HOSPADM

## 2018-10-03 RX ORDER — DIPHENHYDRAMINE HYDROCHLORIDE 50 MG/ML
12.5 INJECTION, SOLUTION INTRAMUSCULAR; INTRAVENOUS ONCE
Status: DISCONTINUED | OUTPATIENT
Start: 2018-10-03 | End: 2018-10-03 | Stop reason: HOSPADM

## 2018-10-03 RX ORDER — ROCURONIUM BROMIDE 10 MG/ML
INJECTION, SOLUTION INTRAVENOUS AS NEEDED
Status: DISCONTINUED | OUTPATIENT
Start: 2018-10-03 | End: 2018-10-03 | Stop reason: HOSPADM

## 2018-10-03 RX ORDER — ACETAMINOPHEN 500 MG
1000 TABLET ORAL EVERY 6 HOURS
Status: DISCONTINUED | OUTPATIENT
Start: 2018-10-04 | End: 2018-10-05 | Stop reason: HOSPADM

## 2018-10-03 RX ORDER — SODIUM CHLORIDE 0.9 % (FLUSH) 0.9 %
5-10 SYRINGE (ML) INJECTION EVERY 8 HOURS
Status: DISCONTINUED | OUTPATIENT
Start: 2018-10-03 | End: 2018-10-03 | Stop reason: HOSPADM

## 2018-10-03 RX ORDER — ASPIRIN 81 MG/1
81 TABLET ORAL EVERY 12 HOURS
Status: DISCONTINUED | OUTPATIENT
Start: 2018-10-03 | End: 2018-10-05 | Stop reason: HOSPADM

## 2018-10-03 RX ORDER — CYCLOBENZAPRINE HCL 10 MG
5 TABLET ORAL 3 TIMES DAILY
Status: DISCONTINUED | OUTPATIENT
Start: 2018-10-03 | End: 2018-10-05 | Stop reason: HOSPADM

## 2018-10-03 RX ORDER — ONDANSETRON 2 MG/ML
4 INJECTION INTRAMUSCULAR; INTRAVENOUS ONCE
Status: DISCONTINUED | OUTPATIENT
Start: 2018-10-03 | End: 2018-10-03 | Stop reason: HOSPADM

## 2018-10-03 RX ORDER — GABAPENTIN 400 MG/1
1200 CAPSULE ORAL
Status: DISCONTINUED | OUTPATIENT
Start: 2018-10-03 | End: 2018-10-05 | Stop reason: HOSPADM

## 2018-10-03 RX ORDER — PRAVASTATIN SODIUM 20 MG/1
40 TABLET ORAL
Status: DISCONTINUED | OUTPATIENT
Start: 2018-10-03 | End: 2018-10-05 | Stop reason: HOSPADM

## 2018-10-03 RX ORDER — SODIUM CHLORIDE, SODIUM LACTATE, POTASSIUM CHLORIDE, CALCIUM CHLORIDE 600; 310; 30; 20 MG/100ML; MG/100ML; MG/100ML; MG/100ML
75 INJECTION, SOLUTION INTRAVENOUS CONTINUOUS
Status: DISCONTINUED | OUTPATIENT
Start: 2018-10-03 | End: 2018-10-03 | Stop reason: HOSPADM

## 2018-10-03 RX ORDER — FENTANYL CITRATE 50 UG/ML
100 INJECTION, SOLUTION INTRAMUSCULAR; INTRAVENOUS AS NEEDED
Status: DISCONTINUED | OUTPATIENT
Start: 2018-10-03 | End: 2018-10-03 | Stop reason: HOSPADM

## 2018-10-03 RX ORDER — SODIUM CHLORIDE 0.9 % (FLUSH) 0.9 %
5-10 SYRINGE (ML) INJECTION AS NEEDED
Status: DISCONTINUED | OUTPATIENT
Start: 2018-10-03 | End: 2018-10-05 | Stop reason: HOSPADM

## 2018-10-03 RX ORDER — ROPIVACAINE HYDROCHLORIDE 5 MG/ML
INJECTION, SOLUTION EPIDURAL; INFILTRATION; PERINEURAL AS NEEDED
Status: DISCONTINUED | OUTPATIENT
Start: 2018-10-03 | End: 2018-10-03 | Stop reason: HOSPADM

## 2018-10-03 RX ORDER — ONDANSETRON 8 MG/1
8 TABLET, ORALLY DISINTEGRATING ORAL
Status: DISCONTINUED | OUTPATIENT
Start: 2018-10-03 | End: 2018-10-05 | Stop reason: HOSPADM

## 2018-10-03 RX ORDER — TRANEXAMIC ACID 650 1/1
1300 TABLET ORAL
Status: COMPLETED | OUTPATIENT
Start: 2018-10-03 | End: 2018-10-03

## 2018-10-03 RX ORDER — SODIUM CHLORIDE, SODIUM LACTATE, POTASSIUM CHLORIDE, CALCIUM CHLORIDE 600; 310; 30; 20 MG/100ML; MG/100ML; MG/100ML; MG/100ML
1000 INJECTION, SOLUTION INTRAVENOUS CONTINUOUS
Status: DISCONTINUED | OUTPATIENT
Start: 2018-10-03 | End: 2018-10-03 | Stop reason: HOSPADM

## 2018-10-03 RX ORDER — CEFAZOLIN SODIUM/WATER 2 G/20 ML
2 SYRINGE (ML) INTRAVENOUS EVERY 8 HOURS
Status: COMPLETED | OUTPATIENT
Start: 2018-10-03 | End: 2018-10-04

## 2018-10-03 RX ORDER — SODIUM CHLORIDE 9 MG/ML
100 INJECTION, SOLUTION INTRAVENOUS CONTINUOUS
Status: DISPENSED | OUTPATIENT
Start: 2018-10-03 | End: 2018-10-05

## 2018-10-03 RX ORDER — LABETALOL HYDROCHLORIDE 5 MG/ML
INJECTION, SOLUTION INTRAVENOUS AS NEEDED
Status: DISCONTINUED | OUTPATIENT
Start: 2018-10-03 | End: 2018-10-03 | Stop reason: HOSPADM

## 2018-10-03 RX ORDER — CELECOXIB 200 MG/1
200 CAPSULE ORAL EVERY 12 HOURS
Status: DISCONTINUED | OUTPATIENT
Start: 2018-10-03 | End: 2018-10-05 | Stop reason: HOSPADM

## 2018-10-03 RX ORDER — CEFAZOLIN SODIUM/WATER 2 G/20 ML
2 SYRINGE (ML) INTRAVENOUS ONCE
Status: COMPLETED | OUTPATIENT
Start: 2018-10-03 | End: 2018-10-03

## 2018-10-03 RX ORDER — OXYCODONE HYDROCHLORIDE 5 MG/1
5 TABLET ORAL
Status: DISCONTINUED | OUTPATIENT
Start: 2018-10-03 | End: 2018-10-03 | Stop reason: HOSPADM

## 2018-10-03 RX ORDER — NALOXONE HYDROCHLORIDE 0.4 MG/ML
.2-.4 INJECTION, SOLUTION INTRAMUSCULAR; INTRAVENOUS; SUBCUTANEOUS
Status: DISCONTINUED | OUTPATIENT
Start: 2018-10-03 | End: 2018-10-05 | Stop reason: HOSPADM

## 2018-10-03 RX ORDER — EPHEDRINE SULFATE 50 MG/ML
INJECTION, SOLUTION INTRAVENOUS AS NEEDED
Status: DISCONTINUED | OUTPATIENT
Start: 2018-10-03 | End: 2018-10-03 | Stop reason: HOSPADM

## 2018-10-03 RX ORDER — LIDOCAINE HYDROCHLORIDE 20 MG/ML
INJECTION, SOLUTION EPIDURAL; INFILTRATION; INTRACAUDAL; PERINEURAL AS NEEDED
Status: DISCONTINUED | OUTPATIENT
Start: 2018-10-03 | End: 2018-10-03 | Stop reason: HOSPADM

## 2018-10-03 RX ORDER — OXYCODONE HYDROCHLORIDE 5 MG/1
10 TABLET ORAL
Status: DISCONTINUED | OUTPATIENT
Start: 2018-10-03 | End: 2018-10-03 | Stop reason: HOSPADM

## 2018-10-03 RX ORDER — HYDROMORPHONE HYDROCHLORIDE 2 MG/ML
1 INJECTION, SOLUTION INTRAMUSCULAR; INTRAVENOUS; SUBCUTANEOUS
Status: DISCONTINUED | OUTPATIENT
Start: 2018-10-03 | End: 2018-10-05 | Stop reason: HOSPADM

## 2018-10-03 RX ORDER — NALOXONE HYDROCHLORIDE 0.4 MG/ML
0.1 INJECTION, SOLUTION INTRAMUSCULAR; INTRAVENOUS; SUBCUTANEOUS AS NEEDED
Status: DISCONTINUED | OUTPATIENT
Start: 2018-10-03 | End: 2018-10-03 | Stop reason: HOSPADM

## 2018-10-03 RX ORDER — HYDROMORPHONE HYDROCHLORIDE 2 MG/ML
0.5 INJECTION, SOLUTION INTRAMUSCULAR; INTRAVENOUS; SUBCUTANEOUS
Status: DISCONTINUED | OUTPATIENT
Start: 2018-10-03 | End: 2018-10-03 | Stop reason: HOSPADM

## 2018-10-03 RX ORDER — FENTANYL CITRATE 50 UG/ML
INJECTION, SOLUTION INTRAMUSCULAR; INTRAVENOUS AS NEEDED
Status: DISCONTINUED | OUTPATIENT
Start: 2018-10-03 | End: 2018-10-03 | Stop reason: HOSPADM

## 2018-10-03 RX ORDER — ACETAMINOPHEN 10 MG/ML
1000 INJECTION, SOLUTION INTRAVENOUS ONCE
Status: COMPLETED | OUTPATIENT
Start: 2018-10-03 | End: 2018-10-03

## 2018-10-03 RX ORDER — DIPHENHYDRAMINE HCL 25 MG
25 CAPSULE ORAL
Status: DISCONTINUED | OUTPATIENT
Start: 2018-10-03 | End: 2018-10-05 | Stop reason: HOSPADM

## 2018-10-03 RX ORDER — AMOXICILLIN 250 MG
2 CAPSULE ORAL DAILY
Status: DISCONTINUED | OUTPATIENT
Start: 2018-10-04 | End: 2018-10-05 | Stop reason: HOSPADM

## 2018-10-03 RX ORDER — PROPOFOL 10 MG/ML
INJECTION, EMULSION INTRAVENOUS AS NEEDED
Status: DISCONTINUED | OUTPATIENT
Start: 2018-10-03 | End: 2018-10-03 | Stop reason: HOSPADM

## 2018-10-03 RX ADMIN — ASPIRIN 81 MG: 81 TABLET, COATED ORAL at 22:51

## 2018-10-03 RX ADMIN — TRANEXAMIC ACID 1300 MG: 650 TABLET, FILM COATED ORAL at 19:50

## 2018-10-03 RX ADMIN — ROCURONIUM BROMIDE 5 MG: 10 INJECTION, SOLUTION INTRAVENOUS at 12:31

## 2018-10-03 RX ADMIN — CELECOXIB 200 MG: 200 CAPSULE ORAL at 22:51

## 2018-10-03 RX ADMIN — LABETALOL HYDROCHLORIDE 5 MG: 5 INJECTION, SOLUTION INTRAVENOUS at 13:12

## 2018-10-03 RX ADMIN — TRANEXAMIC ACID 1 G: 100 INJECTION, SOLUTION INTRAVENOUS at 12:45

## 2018-10-03 RX ADMIN — SUCCINYLCHOLINE CHLORIDE 140 MG: 20 INJECTION INTRAMUSCULAR; INTRAVENOUS at 12:31

## 2018-10-03 RX ADMIN — HYDROMORPHONE HYDROCHLORIDE 0.4 MG: 2 INJECTION, SOLUTION INTRAMUSCULAR; INTRAVENOUS; SUBCUTANEOUS at 13:14

## 2018-10-03 RX ADMIN — FENTANYL CITRATE 50 MCG: 50 INJECTION, SOLUTION INTRAMUSCULAR; INTRAVENOUS at 12:50

## 2018-10-03 RX ADMIN — LIDOCAINE HYDROCHLORIDE 0.1 ML: 10 INJECTION, SOLUTION INFILTRATION; PERINEURAL at 10:25

## 2018-10-03 RX ADMIN — HYDROMORPHONE HYDROCHLORIDE 0.5 MG: 2 INJECTION, SOLUTION INTRAMUSCULAR; INTRAVENOUS; SUBCUTANEOUS at 14:52

## 2018-10-03 RX ADMIN — TRANEXAMIC ACID 1300 MG: 650 TABLET, FILM COATED ORAL at 23:00

## 2018-10-03 RX ADMIN — Medication 2 G: at 12:25

## 2018-10-03 RX ADMIN — EPHEDRINE SULFATE 15 MG: 50 INJECTION, SOLUTION INTRAVENOUS at 12:42

## 2018-10-03 RX ADMIN — CYCLOBENZAPRINE HYDROCHLORIDE 5 MG: 10 TABLET, FILM COATED ORAL at 17:55

## 2018-10-03 RX ADMIN — HYDROMORPHONE HYDROCHLORIDE 0.2 MG: 2 INJECTION, SOLUTION INTRAMUSCULAR; INTRAVENOUS; SUBCUTANEOUS at 13:38

## 2018-10-03 RX ADMIN — FENTANYL CITRATE 100 MCG: 50 INJECTION, SOLUTION INTRAMUSCULAR; INTRAVENOUS at 13:06

## 2018-10-03 RX ADMIN — FENTANYL CITRATE 50 MCG: 50 INJECTION, SOLUTION INTRAMUSCULAR; INTRAVENOUS at 12:31

## 2018-10-03 RX ADMIN — Medication 3 AMPULE: at 10:25

## 2018-10-03 RX ADMIN — PRAVASTATIN SODIUM 40 MG: 20 TABLET ORAL at 22:51

## 2018-10-03 RX ADMIN — LIDOCAINE HYDROCHLORIDE 60 MG: 20 INJECTION, SOLUTION EPIDURAL; INFILTRATION; INTRACAUDAL; PERINEURAL at 12:31

## 2018-10-03 RX ADMIN — SODIUM CHLORIDE, SODIUM LACTATE, POTASSIUM CHLORIDE, AND CALCIUM CHLORIDE: 600; 310; 30; 20 INJECTION, SOLUTION INTRAVENOUS at 13:15

## 2018-10-03 RX ADMIN — LABETALOL HYDROCHLORIDE 5 MG: 5 INJECTION, SOLUTION INTRAVENOUS at 13:38

## 2018-10-03 RX ADMIN — LABETALOL HYDROCHLORIDE 10 MG: 5 INJECTION, SOLUTION INTRAVENOUS at 13:10

## 2018-10-03 RX ADMIN — TUBERCULIN PURIFIED PROTEIN DERIVATIVE 5 UNITS: 5 INJECTION, SOLUTION INTRADERMAL at 10:26

## 2018-10-03 RX ADMIN — ACETAMINOPHEN 1000 MG: 500 TABLET, FILM COATED ORAL at 22:54

## 2018-10-03 RX ADMIN — ACETAMINOPHEN 1000 MG: 10 INJECTION, SOLUTION INTRAVENOUS at 17:31

## 2018-10-03 RX ADMIN — PROPOFOL 50 MG: 10 INJECTION, EMULSION INTRAVENOUS at 13:08

## 2018-10-03 RX ADMIN — CYCLOBENZAPRINE HYDROCHLORIDE 5 MG: 10 TABLET, FILM COATED ORAL at 22:52

## 2018-10-03 RX ADMIN — SODIUM CHLORIDE, SODIUM LACTATE, POTASSIUM CHLORIDE, AND CALCIUM CHLORIDE 500 ML: 600; 310; 30; 20 INJECTION, SOLUTION INTRAVENOUS at 10:00

## 2018-10-03 RX ADMIN — PROPOFOL 150 MG: 10 INJECTION, EMULSION INTRAVENOUS at 12:31

## 2018-10-03 RX ADMIN — SODIUM CHLORIDE, SODIUM LACTATE, POTASSIUM CHLORIDE, AND CALCIUM CHLORIDE: 600; 310; 30; 20 INJECTION, SOLUTION INTRAVENOUS at 12:24

## 2018-10-03 RX ADMIN — SODIUM CHLORIDE, SODIUM LACTATE, POTASSIUM CHLORIDE, AND CALCIUM CHLORIDE 1000 ML: 600; 310; 30; 20 INJECTION, SOLUTION INTRAVENOUS at 11:29

## 2018-10-03 RX ADMIN — HYDROMORPHONE HYDROCHLORIDE 0.2 MG: 2 INJECTION, SOLUTION INTRAMUSCULAR; INTRAVENOUS; SUBCUTANEOUS at 13:35

## 2018-10-03 RX ADMIN — TRANEXAMIC ACID 1 G: 100 INJECTION, SOLUTION INTRAVENOUS at 13:46

## 2018-10-03 RX ADMIN — PROPOFOL 50 MG: 10 INJECTION, EMULSION INTRAVENOUS at 13:07

## 2018-10-03 RX ADMIN — OXYCODONE HYDROCHLORIDE 5 MG: 5 TABLET ORAL at 17:54

## 2018-10-03 RX ADMIN — Medication 2 G: at 19:50

## 2018-10-03 RX ADMIN — Medication 1 AMPULE: at 22:53

## 2018-10-03 RX ADMIN — GABAPENTIN 1200 MG: 400 CAPSULE ORAL at 22:51

## 2018-10-03 RX ADMIN — HYDROMORPHONE HYDROCHLORIDE 0.5 MG: 2 INJECTION, SOLUTION INTRAMUSCULAR; INTRAVENOUS; SUBCUTANEOUS at 15:00

## 2018-10-03 NOTE — PERIOP NOTES
Betadine lavage: 17.5ml betadien BVE#[id number] exp:01/20.  500ml normal saline lot# [id number] exp: 06/01/2021. Left hip

## 2018-10-03 NOTE — PROGRESS NOTES
TRANSFER - IN REPORT: 
 
Verbal report received from Desmond Carver on Jack Clarity  being received from PACU for routine progression of care Report consisted of patients Situation, Background, Assessment and  
Recommendations(SBAR). Information from the following report(s) SBAR was reviewed with the receiving nurse. Opportunity for questions and clarification was provided. Assessment completed upon patients arrival to unit and care assumed.

## 2018-10-03 NOTE — ANESTHESIA POSTPROCEDURE EVALUATION
Post-Anesthesia Evaluation and Assessment Patient: Capo Majano MRN: [id number]  SSN: [id number] YOB: 1945  Age: 74 y.o. Sex: male Cardiovascular Function/Vital Signs Visit Vitals  /76  Pulse 73  Temp 36.7 °C (98 °F)  Resp 16  
 Ht 5' 6\" (1.676 m)  Wt 67.6 kg (149 lb)  SpO2 97%  BMI 24.05 kg/m2 Patient is status post general anesthesia for Procedure(s): LEFT TOTAL HIP ARTHROPLASTY. Nausea/Vomiting: None Postoperative hydration reviewed and adequate. Pain: 
Pain Scale 1: Numeric (0 - 10) (10/03/18 1500) Pain Intensity 1: 5 (10/03/18 1500) Managed Neurological Status:  
Neuro (WDL): Exceptions to WDL (10/03/18 1441) Neuro Neurologic State: Drowsy (10/03/18 1441) Orientation Level: Oriented to person;Oriented to place;Oriented to situation (10/03/18 1451) Cognition: Follows commands (10/03/18 1451) Speech: Clear (10/03/18 1451) LUE Motor Response: Purposeful (10/03/18 1451) LLE Motor Response: Purposeful (10/03/18 1451) RUE Motor Response: Purposeful (10/03/18 1451) RLE Motor Response: Purposeful (10/03/18 1451) At baseline Mental Status and Level of Consciousness: Awake. Pulmonary Status:  
O2 Device: Nasal cannula (10/03/18 1509) Adequate oxygenation and airway patent Complications related to anesthesia: None Post-anesthesia assessment completed. No concerns Signed By: Tyra Cooks, MD   
 October 3, 2018
no abdominal pain/no diarrhea

## 2018-10-03 NOTE — PROGRESS NOTES
10/03/18 1713 Oxygen Therapy O2 Sat (%) 93 % Pulse via Oximetry 72 beats per minute O2 Device Room air Patient achieved 1500  Ml/sec on IS. Patient encouraged to do every hour while awake-patient agreed and demonstrated. No shortness of breath or distress noted. BS are clear b/l.   
[facility] notes reviewed- continuous sat #6 ordered HS

## 2018-10-03 NOTE — PERIOP NOTES
TRANSFER - OUT REPORT: 
 
Verbal report given to [facility], RN on Martin Soriano  being transferred to [facility] for routine progression of care Report consisted of patients Situation, Background, Assessment and  
Recommendations(SBAR). Information from the following report(s) Kardex, MAR and Recent Results was reviewed with the receiving nurse. Lines:  
Peripheral IV 10/03/18 Left Hand (Active) Site Assessment Clean, dry, & intact 10/3/2018 10:27 AM  
Phlebitis Assessment 0 10/3/2018 10:27 AM  
Infiltration Assessment 0 10/3/2018 10:27 AM  
Dressing Status Clean, dry, & intact 10/3/2018 10:27 AM  
Dressing Type Transparent;Tape 10/3/2018 10:27 AM  
Hub Color/Line Status Green;Patent; Infusing 10/3/2018 10:27 AM  
Action Taken Blood drawn 10/3/2018 10:27 AM  
  
 
Opportunity for questions and clarification was provided. Patient transported with: 
 [employer]

## 2018-10-03 NOTE — CONSULTS
Physical Medicine & Rehabilitation Note-consult    Patient: Janice Burgos MRN: [id number]  SSN: [id number]    YOB: 1945  Age: 74 y.o. Sex: male      Admit Date: 10/3/2018  Admitting Physician: Kyrie Mejias MD    Medical Decision Making/Plan/Recommend: Gait impairment and functional deficits following left total hip arthroplasty. Best care option is admission to SNF and sub acute rehab program. Patient has non acute medical conditions described below and functional deficits. Patient will benefit from daily skilled rehabilitation efforts and regular medical and nursing care at SNF. Continue PT, OT for LLE strengthening, mobility, transfers, and gait training. Will follow progress. Chief Complaint : Gait dysfunction secondary to below. 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]  left  total hip arthroplasty   10/3/2018  Pain  DVT risk  Post op hemorrhagic anemia  DDD  DM  Acute Rehab Dx:  Gait impairment  Debility    Mobility and ambulation deficits  Self Care/ADL deficits    Medical Dx:  Past Medical History:   Diagnosis Date    Cancer ([address]     bladder    Chronic pain     back, hip    Constipation     DDD (degenerative disc disease), lumbar     Diabetes ([address]     pt reports borderline-does not check FBS    GERD (gastroesophageal reflux disease)     controlled with med    Hypercholesteremia     Kidney stones     Nausea & vomiting     request preop med    Scoliosis     Spinal stenosis      Subjective:       HPI: Janice Burgos is a 74 y.o. male patient at [address] [place] [address] who was admitted on 10/3/2018  by Kyrie Mejias MD with below mentioned medical history, is being seen for Physical Medicine and Rehabilitation consult. Janice Burgos had painful left hip due to severe DJD, AVN that has been refractory to conservative management. The symptoms were aggravated by weight bearing, walking and activity, limiting daily function.  Patient underwent a left DIANE per Dr. Olesya De Jesus MD on 10/3/2018. The patient's ayaz operative course has been uneventful. We are consulted to assist with rehab needs and placement. Patient is to be WBAT LLE. Hip precautions are to be followed strictly. Patient is starting to work with acute PT, OT, standing, taking steps with minimum assistance so far. Patient still shows significant functional deficits due to right hip pain, decreased ROM and poor strength. Nigel Caldwell is seen and examined with ambulation using a eliza-walker for walking prior to admission, but limited by hip pain. Prior Level of Function/Work/Activity:  Pt was functioning with a cane out of the home & a eliza-walker in the home prior to this admission. Present Level of Function: bed mobility - min A, transfers - min A, decreased balance , ambulation -60 with min assist using a RW. History reviewed. No pertinent family history. Social History   Substance Use Topics    Smoking status: Former Smoker    Smokeless tobacco: Never Used    Alcohol use No     Past Surgical History:   Procedure Laterality Date    HX HERNIA REPAIR      HX OTHER SURGICAL      pilonidal cyst removal    HX OTHER SURGICAL      facial reconstruction    HX UROLOGICAL      bladder      Prior to Admission medications    Medication Sig Start Date End Date Taking? Authorizing Provider   HYDROcodone-acetaminophen (NORCO)  mg tablet Take 1 Tab by mouth three (3) times daily. Max Daily Amount: 3 Tabs. Indications: Pain 10/4/18  Yes Olesya De Jesus MD   acetaminophen (TYLENOL) 500 mg tablet Take 2 Tabs by mouth every six (6) hours. 10/4/18  Yes Olesya De Jesus MD   aspirin delayed-release 81 mg tablet Take 1 Tab by mouth every twelve (12) hours every twelve (12) hours. 10/4/18  Yes Olesya De Jesus MD   cyclobenzaprine (FLEXERIL) 10 mg tablet Take 0.5 Tabs by mouth three (3) times daily.  10/4/18  Yes Olesya De Jesus MD   ondansetron (ZOFRAN ODT) 8 mg disintegrating tablet Take 1 Tab by mouth every eight (8) hours as needed for Nausea. 10/4/18  Yes Thea Blum MD   senna-docusate (PERICOLACE) 8.6-50 mg per tablet Take 2 Tabs by mouth daily. 10/4/18  Yes Thea Blum MD   raNITIdine hcl 150 mg capsule Take 150 mg by mouth two (2) times a day. Indications: gastroesophageal reflux disease   Yes Historical Provider   celecoxib (CELEBREX) 200 mg capsule Take 200 mg by mouth daily. Indications: OSTEOARTHRITIS   Yes Historical Provider   oxyCODONE ER [facility] ER) 9 mg capsule Take 9 mg by mouth every twelve (12) hours. Indications: Severe Pain   Yes Historical Provider   gabapentin (NEURONTIN) 600 mg tablet Take 600 mg by mouth three (3) times daily. 1 tablet AM and mid-day, 2 tablets at night   Indications: pain   Yes Historical Provider   metFORMIN (GLUMETZA ER) 500 mg TG24 24 hour tablet Take  by mouth daily. Indications: PREVENTION OF TYPE 2 DIABETES MELLITUS   Yes Historical Provider   pravastatin (PRAVACHOL) 40 mg tablet Take 40 mg by mouth nightly. Indications: hypercholesterolemia   Yes Historical Provider   docusate sodium (COLACE) 100 mg capsule Take 100 mg by mouth nightly. Indications: constipation   Yes Historical Provider   ibuprofen (MOTRIN) 200 mg tablet Take  by mouth every six (6) hours as needed for Pain. Stop 5 days prior to surgery per anesthesia guidelines. Historical Provider   cyanocobalamin (VITAMIN B-12) 1,000 mcg tablet Take 1,000 mcg by mouth daily. Historical Provider   calcium-cholecalciferol, D3, (CALTRATE 600+D) tablet Take 1 Tab by mouth daily. Historical Provider     No Known Allergies     Review of Systems: + knee pain, +antalgic gait. Denies chest pain, shortness of breath, cough, headache, visual problems, abdominal pain, dysurea, calf pain. Pertinent positives are as noted in the medical records and unremarkable otherwise.     Objective:     Vitals:  Blood pressure 120/73, pulse 78, temperature 98.5 °F (36.9 °C), resp. rate 18, height 5' 6\" (1.676 m), weight 149 lb (67.6 kg), SpO2 91 %. Temp (24hrs), Av.3 °F (36.8 °C), Min:97.5 °F (36.4 °C), Max:98.7 °F (37.1 °C)    BMI (calculated): 24.1 (10/01/18 0823)   Intake and Output:  10/02 1901 - 10/04 0700  In: 2680 [P.O.:480; I.V.:2200]  Out: 1900 [Urine:1600]    Physical Exam:   General: Alert and age appropriately oriented. No acute cardio respiratory distress. HEENT: Normocephalic, no conjunctival pallor, no scleral icterus  Oral mucosa moist without cyanosis   Lungs: Clear to auscultation bilaterally. Respiration even and unlabored   Heart: Regular rate and rhythm, S1, S2   No  murmurs, clicks, rub or gallops   Abdomen: Soft, non-tender, non-distended. Genitourinary: defered   Neuromuscular:      Grossly no focal motor deficits. Left knee extension strong. Left ankle dorsiflexion 5/5  Left ankle plantarflexion 5/5  No sensory deficits distally BLE to soft touch. Skin/extremity: Calves non tender BLE. No rashes, no marginal erythema.                                                                                          Labs/Studies:  Recent Results (from the past 72 hour(s))   TYPE & SCREEN    Collection Time: 10/03/18 10:42 AM   Result Value Ref Range    Crossmatch Expiration 10/06/2018     ABO/Rh(D) Darol Cashing POSITIVE     Antibody screen NEG    GLUCOSE, POC    Collection Time: 10/03/18 10:48 AM   Result Value Ref Range    Glucose (POC) 121 (H) 65 - 100 mg/dL   HEMOGLOBIN    Collection Time: 10/03/18  8:07 PM   Result Value Ref Range    HGB 11.3 (L) 13.6 - 17.2 g/dL   HEMOGLOBIN    Collection Time: 10/04/18  4:19 AM   Result Value Ref Range    HGB 10.9 (L) 13.6 - 74.7 g/dL   METABOLIC PANEL, BASIC    Collection Time: 10/04/18  4:19 AM   Result Value Ref Range    Sodium 142 136 - 145 mmol/L    Potassium 3.9 3.5 - 5.1 mmol/L    Chloride 104 98 - 107 mmol/L    CO2 32 21 - 32 mmol/L    Anion gap 6 mmol/L    Glucose 116 (H) 65 - 100 mg/dL    BUN 12 8 - 23 MG/DL Creatinine 0.95 0.8 - 1.5 MG/DL    GFR est AA >60 >60 ml/min/1.73m2    GFR est non-AA >60 ml/min/1.73m2    Calcium 8.2 (L) 8.3 - 10.4 MG/DL       Functional Assessment:  Reviewed participation and progress in therapies  Gross Assessment  AROM: Generally decreased, functional (right LE) (10/03/18 1700)  Strength: Generally decreased, functional (right LE) (10/03/18 1700)  Coordination: Generally decreased, functional (right LE) (10/03/18 1700)   Bed Mobility  Supine to Sit: Contact guard assistance (10/03/18 1700)  Sit to Supine: Minimum assistance (10/03/18 1700)  Scooting: Contact guard assistance (10/03/18 1700)   Balance  Sitting: Intact; Without support (10/03/18 1700)  Standing: Impaired; With support (walker) (10/03/18 1700)               Bed/Mat Mobility  Supine to Sit: Contact guard assistance (10/03/18 1700)  Sit to Supine: Minimum assistance (10/03/18 1700)  Sit to Stand: Contact guard assistance (10/04/18 1000)  Bed to Chair: Contact guard assistance (10/04/18 1000)  Scooting: Contact guard assistance (10/03/18 1700)     Ambulation:  Gait  Speed/Paola: Delayed (10/04/18 1000)  Step Length: Left shortened (10/04/18 1000)  Stance: Left decreased (10/04/18 1000)  Gait Abnormalities: Antalgic;Decreased step clearance (10/04/18 1000)  Ambulation - Level of Assistance: Contact guard assistance (10/04/18 1000)  Distance (ft): 70 Feet (ft) (10/04/18 1000)  Assistive Device: Walker, rolling (10/04/18 1000)  Duration: 15 Minutes (10/04/18 1000)    Impression/Plan:     Active Problems:    AVN (avascular necrosis of bone) ([facility] (10/3/2018)      OA (osteoarthritis) of hip (10/3/2018)        Current Facility-Administered Medications   Medication Dose Route Frequency Provider Last Rate Last Dose    calcium-vitamin D (OS-FAUSTO) 500 mg-200 unit tablet  1 Tab Oral DAILY Thea Blum MD   1 Tab at 10/04/18 1724    cyanocobalamin tablet 1,000 mcg  1,000 mcg Oral DAILY Thea Blum MD   1,000 mcg at 10/04/18 1124    gabapentin (NEURONTIN) capsule 600 mg  600 mg Oral BID Radhika Chan MD   600 mg at 10/04/18 0811    metFORMIN (GLUCOPHAGE) tablet 500 mg  500 mg Oral DAILY Radhika Chan MD   500 mg at 10/04/18 0811    pravastatin (PRAVACHOL) tablet 40 mg  40 mg Oral QHS Radhika Chan MD   40 mg at 10/03/18 2251    alcohol 62% (NOZIN) nasal  1 Ampule  1 Ampule Topical Q12H Radhika Chan MD   1 Ampule at 10/04/18 0810    0.9% sodium chloride infusion  100 mL/hr IntraVENous CONTINUOUS Radhika Chan MD        sodium chloride (NS) flush 5-10 mL  5-10 mL IntraVENous PRN Radhika Chan MD        acetaminophen (TYLENOL) tablet 1,000 mg  1,000 mg Oral Q6H Radhika Chan MD   1,000 mg at 10/04/18 0641    celecoxib (CELEBREX) capsule 200 mg  200 mg Oral Q12H Radhika Chan MD   200 mg at 10/04/18 0811    naloxone (NARCAN) injection 0.2-0.4 mg  0.2-0.4 mg IntraVENous Q10MIN PRN Radhika Chan MD        diphenhydrAMINE (BENADRYL) capsule 25 mg  25 mg Oral Q4H PRN Radhika Chan MD        aspirin delayed-release tablet 81 mg  81 mg Oral Q12H Radhika Chan MD   81 mg at 10/04/18 0811    oxyCODONE IR (ROXICODONE) tablet 5 mg  5 mg Oral Q3H PRN Radhika Chan MD   5 mg at 10/04/18 0813    HYDROmorphone (PF) (DILAUDID) injection 1 mg  1 mg IntraVENous Q3H PRN Radhika Chan MD        senna-docusate (PERICOLACE) 8.6-50 mg per tablet 2 Tab  2 Tab Oral DAILY Radhika Chan MD   2 Tab at 10/04/18 0875    cyclobenzaprine (FLEXERIL) tablet 5 mg  5 mg Oral TID Radhika Chan MD   Stopped at 10/04/18 0900    ondansetron (ZOFRAN ODT) tablet 8 mg  8 mg Oral Q8H PRN Radhika Chan MD        oxyCODONE ER [facility] ER) capsule 9 mg (Patient Supplied)  9 mg Oral Q12H Radhika Chan MD   9 mg at 10/03/18 2100    gabapentin (NEURONTIN) capsule 1,200 mg  1,200 mg Oral QHS Radhika Chan MD   1,200 mg at 10/03/18 5390        Recommendations: Recommend sub acute rehab at [facility].    Continue Acute Rehab Program. Continue gait training, transfer training, balance activities. Coordination of rehab/medical care. Counseling of Physical Medicine & Rehab care issues management. Will follow with SW/ /Admissions Coordinators regarding insurance approvals and acceptance. Rehabilitation Management/ Medical Management: 1. Devices:Walkers, Type: Rolling Walker  2. Consult:Rehab team including PT, OT,  and . 3. Disposition Rehab-discussed with patient. 4. Thigh-high or knee-high SIMONE's when out of bed. 5. DVT Prophylaxis - aspirin 81mg bid x 30days. 6. Incentive spirometer Q1H while awake  7. Post op hemorrhagic anemia- monitor. 8. Activity: WBAT LLE, total hip precautions. Discussed. 9. Planned Labs: CBC,BMP  10. Pain Control:    Continue scheduled tylenol, celebrex and  PRN meds. Resume home neurontin. 11.Wound Care: Keep left TKA wound clean and dry and reinforce dressing PRN. May remove Aquacel 1 week post op ad replace with new one. Remove staples 12-14 post surgery, when incision appears appropriately closed and apply benzoin and 1/2\" steristrips. Follow up with ORTHO per instructions. Thank you for the opportunity to participate in the care of this patient.     Signed By: Mica Evangelista MD

## 2018-10-03 NOTE — PERIOP NOTES
Teach back method used with patient concerning hibiclens wash, TB screening, incentive spirometer(    ), and pain management goals.  Patient and family were provided with home discharge needs list.

## 2018-10-03 NOTE — PROGRESS NOTES
Problem: Mobility Impaired (Adult and Pediatric) Goal: *Acute Goals and Plan of Care (Insert Text) GOALS (1-4 days): 
(1.)Mr. Matthew Aparicio will move from supine to sit and sit to supine  in bed with SUPERVISION. (2.)Mr. Matthew Aparicio will transfer from bed to chair and chair to bed with STAND BY ASSIST using the least restrictive device. (3.)Mr. Matthew Aparicio will ambulate with STAND BY ASSIST for 200 feet with the least restrictive device. (4.)Mr. Matthew Aparicio will ambulate up/down 7 steps with right railing with CONTACT GUARD ASSIST with no device. (5.)Mr. Matthew Aparicio will state/observe TAJ precautions with No verbal cues. ________________________________________________________________________________________________ PHYSICAL THERAPY [facility] [place]: Initial Assessment, Treatment Day: Day of Assessment, PM 10/3/2018 INPATIENT: Hospital Day: 1 [place] / Product Type: Medicare /  
  
NAME/AGE/GENDER: Val Bernal is a 74 y.o. male PRIMARY DIAGNOSIS:  Idiopathic aseptic necrosis of femur, left ([facility] [M87.052] Procedure(s) and Anesthesia Type: 
   * LEFT TOTAL HIP ARTHROPLASTY - Spinal (Left) ICD-10: Treatment Diagnosis:  
 · Pain in left hip (M25.552) · Stiffness of Left Hip, Not elsewhere classified (M25.652) · Difficulty in walking, Not elsewhere classified (R26.2) ASSESSMENT:  
 
Mr. Matthew Aparicio presents with impaired strength & mobility s/p left TAJ. Pt also had decreased stability during out of bed activity. Pt will benefit from follow up therapy to help restore safe function prior to returning home with caregiver. If pt does not have a capable caregiver to assist on hospital DC, this pt will need a post acute rehab stay prior to returning home. This section established at most recent assessment PROBLEM LIST (Impairments causing functional limitations): 1. Decreased Strength 2. Decreased ADL/Functional Activities 3. Decreased Transfer Abilities 4. Decreased Ambulation Ability/Technique 5. Decreased Balance 6. Increased Pain 7. Decreased Activity Tolerance 8. Decreased Knowledge of Precautions 9. Decreased [place] with Home Exercise Program 
 INTERVENTIONS PLANNED: (Benefits and precautions of physical therapy have been discussed with the patient.) 1. Bed Mobility 2. Gait Training 3. Home Exercise Program (HEP) 4. Therapeutic Exercise/Strengthening 5. Transfer Training 6. Range of Motion: active/assisted/passive 7. Therapeutic Activities 8. Group Therapy TREATMENT PLAN: Frequency/Duration: Follow patient BID for duration of hospital stay to address above goals. Rehabilitation Potential For Stated Goals: Good RECOMMENDED REHABILITATION/EQUIPMENT: (at time of discharge pending progress): Continue Skilled Therapy and Rehab. HISTORY:  
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 The patient was evaluated and examined in the office prior to today and was found to have a history and physical exam consistent with intra-articular pathology of the left hip. Patient complains of anterior groin pain predominately. Pain occurs during activity. Patient has difficulty putting on socks/shoes and has notable pain when getting up from a chair and getting out of a car. Patient does not complain of significant lateral hip pain or radicular pain down the leg. There have been no changes to the patient's orthopedic condition since the last office visit Past Medical History/Comorbidities:  
Mr. Mary Cuevas  has a past medical history of Cancer ([address] Chronic pain; Constipation; DDD (degenerative disc disease), lumbar; Diabetes ([address] GERD (gastroesophageal reflux disease); Hypercholesteremia; Kidney stones; Nausea & vomiting; Scoliosis; and Spinal stenosis. He also has no past medical history of Adverse effect of anesthesia;  Difficult intubation; Malignant hyperthermia due to anesthesia; or Pseudocholinesterase deficiency. Mr. Flavia Naik  has a past surgical history that includes hx hernia repair; hx urological; hx other surgical; and hx other surgical. 
Social History/Living Environment:  
Home Environment: Private residence # Steps to Enter: 7 Rails to Enter: Yes Hand Rails : Right Living Alone: No 
Support Systems: Child(tae), Family member(s) Patient Expects to be Discharged to[de-identified] Rehabilitation facility ([facility] rehab) Current DME Used/Available at Home: Cane, straight (eliza-walker) Tub or Shower Type: Shower Prior Level of Function/Work/Activity: Pt was functioning with a cane out of the home & a eliza-walker in the home prior to this admission. Number of Personal Factors/Comorbidities that affect the Plan of Care: 3+: HIGH COMPLEXITY EXAMINATION:  
Most Recent Physical Functioning:  
Gross Assessment: Yes Gross Assessment AROM: Generally decreased, functional (right LE) Strength: Generally decreased, functional (right LE) Coordination: Generally decreased, functional (right LE) Bed Mobility Supine to Sit: Contact guard assistance Sit to Supine: Minimum assistance Scooting: Contact guard assistance Transfers Sit to Stand: Minimum assistance Stand to Sit: Minimum assistance Bed to Chair: Minimum assistance (with walker) Balance Sitting: Intact; Without support Standing: Impaired; With support (walker) Weight Bearing Status Left Side Weight Bearing: As tolerated Distance (ft): 60 Feet (ft) Ambulation - Level of Assistance: Minimal assistance Assistive Device: Walker, rolling Speed/Paola: Delayed Step Length: Right shortened Stance: Left decreased Gait Abnormalities: Antalgic;Decreased step clearance Braces/Orthotics: none Body Structures Involved: 1. Joints 2. Muscles Body Functions Affected: 1. Sensory/Pain 2. Movement Related Activities and Participation Affected: 1. General Tasks and Demands 2. Mobility Number of elements that affect the Plan of Care: 4+: HIGH COMPLEXITY CLINICAL PRESENTATION:  
Presentation: Stable and uncomplicated: LOW COMPLEXITY CLINICAL DECISION MAKING:  
[facility] AM-PAC 6 Clicks Basic Mobility Inpatient Short Form How much difficulty does the patient currently have. .. Unable A Lot A Little None 1. Turning over in bed (including adjusting bedclothes, sheets and blankets)? [] 1   [] 2   [x] 3   [] 4  
2. Sitting down on and standing up from a chair with arms ( e.g., wheelchair, bedside commode, etc.)   [] 1   [] 2   [x] 3   [] 4  
3. Moving from lying on back to sitting on the side of the bed? [] 1   [] 2   [x] 3   [] 4 How much help from another person does the patient currently need. .. Total A Lot A Little None 4. Moving to and from a bed to a chair (including a wheelchair)? [] 1   [] 2   [x] 3   [] 4  
5. Need to walk in hospital room? [] 1   [] 2   [x] 3   [] 4  
6. Climbing 3-5 steps with a railing? [x] 1   [] 2   [] 3   [] 4  
© 2007, Trustees of [facility], under license to [employer]. All rights reserved Score:  Initial: 16 Most Recent: X (Date: -- ) Interpretation of Tool:  Represents activities that are increasingly more difficult (i.e. Bed mobility, Transfers, Gait). Score 24 23 22-20 19-15 14-10 9-7 6 Modifier CH CI CJ CK CL CM CN   
 
? Mobility - Walking and Moving Around:  
  - CURRENT STATUS: CK - 40%-59% impaired, limited or restricted  - GOAL STATUS: CJ - 20%-39% impaired, limited or restricted  - D/C STATUS:  ---------------To be determined--------------- [place] / [place] B / Product Type: Medicare /   
 
Medical Necessity:    
· Patient is expected to demonstrate progress in strength, balance, coordination and functional technique to decrease assistance required with bed mobility, transfers & gait. Reason for Services/Other Comments: 
· Patient continues to require skilled intervention due to pt not safe with functional mobility. Use of outcome tool(s) and clinical judgement create a POC that gives a: Clear prediction of patient's progress: LOW COMPLEXITY  
  
 
 
 
TREATMENT:  
(In addition to Assessment/Re-Assessment sessions the following treatments were rendered) Pre-treatment Symptoms/Complaints:  none Pain: Initial: visual scale Pain Intensity 1: 3 Pain Location 1: Hip Pain Orientation 1: Left Pain Intervention(s) 1: Ambulation/Increased Activity, Exercise  Post Session:  3/10 Therapeutic Exercise: (13 Minutes):  Exercises per grid below to improve mobility, strength, balance, coordination and dynamic movement of leg - left to improve functional endurance. Required minimal verbal cues to promote proper body alignment and promote proper body mechanics. Progressed repetitions as indicated. Assessment/ 12 min Date: 
10/3 Date: 
 Date: 
  
ACTIVITY/EXERCISE AM PM AM PM AM PM  
GROUP THERAPY  []  []  []  []  []  [] Ankle Pumps  10 Quad Sets  10 Gluteal Sets  10 Hip ABd/ADduction  10 Straight Leg Raises  --      
Knee Slides  10 Short Arc Quads  10 [address] [facility] [address]  10 Chair Slides B = bilateral; AA = active assistive; A = active; P = passive Treatment/Session Assessment:   
 Response to Treatment:  Tolerated well. Education: 
[x] Home Exercises [x] Fall Precautions [x] Hip Precautions [] D/C Instruction Review 
[] Knee/Hip Prosthesis Review [x] Walker Management/Safety [] Adaptive Equipment as Needed Interdisciplinary Collaboration:  
o Registered Nurse 
o Certified Nursing Assistant/Patient Care Technician After treatment position/precautions:  
o Up in chair 
o Caregiver at bedside o Call light within reach 
o RN notified 
o Family at bedside Compliance with Program/Exercises: Will assess as treatment progresses. Recommendations/Intent for next treatment session:  Treatment next visit will focus on increasing Mr. Escalante's independence with bed mobility, transfers, gait training, strength/ROM exercises, modalities for pain, and patient education. Total Treatment Duration: PT Patient Time In/Time Out Time In: 1865 Time Out: 5235 Marisabel Adams PT

## 2018-10-03 NOTE — H&P
[facility] [employer] and [facility] Pre Operative History and Physical Exam 
 
Patient ID: 
Sil Aguilar [id number] 
6 y.o. 
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 The patient was evaluated and examined in the office prior to today and was found to have a history and physical exam consistent with intra-articular pathology of the left hip. Patient complains of anterior groin pain predominately. Pain occurs during activity. Patient has difficulty putting on socks/shoes and has notable pain when getting up from a chair and getting out of a car. Patient does not complain of significant lateral hip pain or radicular pain down the leg. There have been no changes to the patient's orthopedic condition since the last office visit Past Medical History: 
Past Medical History:  
Diagnosis Date  Cancer ([facility] bladder  Chronic pain   
 back, hip  Constipation  DDD (degenerative disc disease), lumbar  Diabetes ([facility]   
 pt reports borderline-does not check FBS  GERD (gastroesophageal reflux disease)   
 controlled with med  Hypercholesteremia  Kidney stones  Nausea & vomiting   
 request preop med  Scoliosis  Spinal stenosis Past Surgical History: 
Past Surgical History:  
Procedure Laterality Date  HX HERNIA REPAIR    
 HX OTHER SURGICAL    
 pilonidal cyst removal  
 HX OTHER SURGICAL    
 facial reconstruction  HX UROLOGICAL    
 bladder Medications: 
  
Prior to Admission medications Medication Sig Start Date End Date Taking? Authorizing Provider HYDROcodone-acetaminophen (NORCO)  mg tablet Take 1 Tab by mouth three (3) times daily. Indications: Pain    Historical Provider  
raNITIdine hcl 150 mg capsule Take 150 mg by mouth two (2) times a day. Indications: gastroesophageal reflux disease    Historical Provider celecoxib (CELEBREX) 200 mg capsule Take 200 mg by mouth daily. Indications: OSTEOARTHRITIS    Historical Provider  
oxyCODONE ER [facility]) 9 mg capsule Take 9 mg by mouth every twelve (12) hours. Indications: Severe Pain    Historical Provider  
gabapentin (NEURONTIN) 600 mg tablet Take 600 mg by mouth three (3) times daily. 1 tablet AM and mid-day, 2 tablets at night   Indications: pain    Historical Provider  
metFORMIN (GLUMETZA ER) 500 mg TG24 24 hour tablet Take  by mouth daily. Indications: PREVENTION OF TYPE 2 DIABETES MELLITUS    Historical Provider  
pravastatin (PRAVACHOL) 40 mg tablet Take 40 mg by mouth nightly. Indications: hypercholesterolemia    Historical Provider  
ibuprofen (MOTRIN) 200 mg tablet Take  by mouth every six (6) hours as needed for Pain. Stop 5 days prior to surgery per anesthesia guidelines. Historical Provider  
docusate sodium (COLACE) 100 mg capsule Take 100 mg by mouth nightly. Indications: constipation    Historical Provider  
cyanocobalamin (VITAMIN B-12) 1,000 mcg tablet Take 1,000 mcg by mouth daily. Historical Provider  
calcium-cholecalciferol, D3, (CALTRATE 600+D) tablet Take 1 Tab by mouth daily. Historical Provider Family History: No family history on file. Social History:  
  
Social History Substance Use Topics  Smoking status: Former Smoker  Smokeless tobacco: Never Used  Alcohol use No  
   
 
Allergies:  
 No Known Allergies Vitals:  
  
Visit Vitals  Ht 5' 6\" (1.676 m)  Wt 67.6 kg (149 lb)  BMI 24.05 kg/m2 Objective:  
      General: No Acute distress HEENT: Normocephalic/atramatic Lungs:  Breathing non-labored Heart:   RRR Abdomen: soft Extremities:  Pain with ROM of the left hip which manifests as anterior groin pain.  There is decreased internal and external rotation of the affected hip. No significant pain with palpation over the greater trochanteric bursa. No radicular pain with straight leg raise. Patient walks with and antalgic gait. Distally patient has no neurologic deficit. Patient Active Problem List  
Diagnosis Code  Snoring R06.83  
 AVN (avascular necrosis of bone) ([facility]) M87.00 Assessment: 1. AVN with Arthritis of the Left hip Plan: The patient has failed previous conservative treatment for this condition. The patient has pain in the left hip which causes daily symptoms which affects the patient's activities of daily living and quality of life. The risks, benefits, alternatives and possible complications of left total hip arthroplasty have been discussed with the patient including but not limited to infection, bleeding, damage to nerves and blood vessels with particular attention given to risk of damage of the femoral, obturator, lateral femoral cutaneous, superior gluteal, inferior gluteal, and sciatic nerve, risk of dislocation, fracture both intra-op and post-op, limb length inequality, polyethylene wear, implant loosening, risk for continued pain, and risk for revision surgery secondary to but not limited to all of these discussed risks. Further we discussed risk of venous thrombo-embolism including deep vein thrombosis and pulmonary embolism despite being on prophylaxis. We have also previously discussed the potential of morbidity and mortality associated with, but not limited to, the actual surgical procedure, anesthesia, prior medical conditions, and/or the administration of medications perioperatively. I have made no guarantees to the patient regarding outcomes and the patient has voiced understanding of that. The patient has been given the opportunity to ask questions all of which have been answered and the patient wishes to proceed. The patient will be admitted the day of surgery for left total hip replacement. Signed By: Jorge Desir MD 
October 3, 2018

## 2018-10-03 NOTE — ANESTHESIA PREPROCEDURE EVALUATION
Anesthetic History PONV Review of Systems / Medical History Patient summary reviewed and pertinent labs reviewed Pulmonary Within defined limits Neuro/Psych Cardiovascular Within defined limits Exercise tolerance: >4 METS 
  
GI/Hepatic/Renal 
  
GERD Endo/Other Diabetes: well controlled, type 2 Arthritis Other Findings Comments: Scoliosis Spinal Stenosis - episodes of parasthesia and complete numbness of BLE in recent past  
 
 
 
Physical Exam 
 
Airway TM Distance: 4 - 6 cm Neck ROM: normal range of motion Mouth opening: Normal 
 
 Cardiovascular Rhythm: regular Rate: normal 
 
 
 
 Dental 
No notable dental hx Pulmonary Breath sounds clear to auscultation Abdominal 
GI exam deferred Other Findings Anesthetic Plan ASA: 2 Anesthesia type: general 
 
 
 
 
 
Anesthetic plan and risks discussed with: Patient and Family Will perform GA given lumbar morbidity

## 2018-10-03 NOTE — PROGRESS NOTES
Care Management Interventions Mode of Transport at Discharge: Self Transition of Care Consult (CM Consult): Other (acute rehab) Physical Therapy Consult: Yes Occupational Therapy Consult: Yes Current Support Network: Lives with Spouse Confirm Follow Up Transport: Family Plan discussed with Pt/Family/Caregiver: Yes Freedom of Choice Offered: Yes Discharge Location Discharge Placement: Rehab hospital/unit acute Patient is a 74y.o. year old male admitted for Left DIANE . Pt/ family requesting rehab at [facility]. They have both Acute and skilled levels of rehab. Referral sent for their review. If approved for their acute side he can go on Friday (three night stay not required) if stable,   if approved for skilled-he cannot go until Sat 10-6. Will follow . Danny Naik

## 2018-10-03 NOTE — PERIOP NOTES
TRANSFER - OUT REPORT: 
 
Verbal report given to Kerry Mckay RN on Janice Burgos  being transferred to ortho room 338(unit) for routine post - op Report consisted of patients Situation, Background, Assessment and  
Recommendations(SBAR). Information from the following report(s) SBAR, OR Summary, Intake/Output and MAR was reviewed with the receiving nurse. Lines:  
Peripheral IV 10/03/18 Left Hand (Active) Site Assessment Clean, dry, & intact 10/3/2018  2:41 PM  
Phlebitis Assessment 0 10/3/2018  2:41 PM  
Infiltration Assessment 0 10/3/2018  2:41 PM  
Dressing Status Clean, dry, & intact 10/3/2018  2:41 PM  
Dressing Type Transparent 10/3/2018  2:41 PM  
Hub Color/Line Status Green; Infusing 10/3/2018  2:41 PM  
Action Taken Blood drawn 10/3/2018 10:27 AM  
  
 
Opportunity for questions and clarification was provided. Patient transported with: 
 O2 @ 2 liters

## 2018-10-03 NOTE — PROGRESS NOTES
Problem: Self Care Deficits Care Plan (Adult) Goal: *Acute Goals and Plan of Care (Insert Text) GOALS:  
DISCHARGE GOALS (in preparation for going home/rehab):  3 days 1. Mr. Matthew Aparicio will perform one lower body dressing activity with minimal assistance with adaptive equipment to demonstrate improved functional mobility and safety. 2.  Mr. Matthew Aparicio will perform one lower body bathing activity with minimal  assistance with adaptive equipment to demonstrate improved functional mobility and safety. 3.  Mr. Matthew Aparicio will perform toileting/toilet transfer with contact guard assistance with adaptive equipment to demonstrate improved functional mobility and safety. 4.  Mr. Matthew Aparicio will perform shower transfer with contact guard assistance with adaptive equipment to demonstrate improved functional mobility and safety. 5.  Mr. Matthew Aparicio will state DIANE precautions with two verbal cues to demonstrate improved functional mobility and safety. JOINT CAMP OCCUPATIONAL THERAPY DIANE: Initial Assessment and Treatment Day: 1st 10/3/2018 INPATIENT: Hospital Day: 1 [place] / [place] / Product Type: Medicare /  
  
NAME/AGE/GENDER: Val Bernal is a 74 y.o. male PRIMARY DIAGNOSIS:  Idiopathic aseptic necrosis of femur, left ([facility] [address] [M87.052] Procedure(s) and Anesthesia Type: 
   * LEFT TOTAL HIP ARTHROPLASTY - Spinal (Left) ICD-10: Treatment Diagnosis:  
 · Pain in left hip (M25.552) · Stiffness of Left Hip, Not elsewhere classified (M25.652) ASSESSMENT:  
 
Mr. Matthew Aparicio is s/p L DIANE and presents with decreased weight bearing on L LE and decreased independence with functional mobility and activities of daily living as compared to prior level of function and safety. Patient would benefit from skilled Occupational Therapy to maximize independence and safety with self-care task and functional mobility.   Pt would also benefit from education on lower body adaptive equipment and hip precautions post-surgery in preparation for rehab vs home. Able to SPT with RW from bed to recliner. This section established at most recent assessment PROBLEM LIST (Impairments causing functional limitations): 1. Decreased Strength 2. Decreased ADL/Functional Activities 3. Decreased Transfer Abilities 4. Increased Pain 5. Increased Fatigue 6. Decreased Flexibility/Joint Mobility 7. Decreased Knowledge of Precautions INTERVENTIONS PLANNED: (Benefits and precautions of occupational therapy have been discussed with the patient.) 1. Activities of daily living training 2. Adaptive equipment training 3. Balance training 4. Clothing management 5. Donning&doffing training 6. Theraputic activity TREATMENT PLAN: Frequency/Duration: Follow patient 5 times a week to address above goals. Rehabilitation Potential For Stated Goals: Excellent RECOMMENDED REHABILITATION/EQUIPMENT: (at time of discharge pending progress): Continue Skilled Therapy. OCCUPATIONAL PROFILE AND HISTORY:  
History of Present Injury/Illness (Reason for Referral): Pt presents this date s/p (left) DIANE. Past Medical History/Comorbidities:  
Mr. Escobedo Parent  has a past medical history of Cancer ([address] Chronic pain; Constipation; DDD (degenerative disc disease), lumbar; Diabetes ([address] GERD (gastroesophageal reflux disease); Hypercholesteremia; Kidney stones; Nausea & vomiting; Scoliosis; and Spinal stenosis. He also has no past medical history of Adverse effect of anesthesia; Difficult intubation; Malignant hyperthermia due to anesthesia; or Pseudocholinesterase deficiency. Mr. Escobedo Parent  has a past surgical history that includes hx hernia repair; hx urological; hx other surgical; and hx other surgical. 
Social History/Living Environment:  
Home Environment: Private residence Living Alone: No 
Support Systems: Spouse/Significant Other/Partner Patient Expects to be Discharged to[de-identified] Rehabilitation facility Prior Level of Function/Work/Activity: 
Indepndent prior. Hx of back issues. Number of Personal Factors/Comorbidities that affect the Plan of Care: Brief history (0):  LOW COMPLEXITY ASSESSMENT OF OCCUPATIONAL PERFORMANCE[de-identified]  
Most Recent Physical Functioning:  
Balance Sitting: Intact Standing: With support Coordination Fine Motor Skills-Upper: Left Intact; Right Intact Gross Motor Skills-Upper: Left Intact; Right Intact Mental Status Neurologic State: Alert Orientation Level: Oriented X4 Cognition: Appropriate decision making Perception: Appears intact Perseveration: No perseveration noted Basic ADLs (From Assessment) Complex ADLs (From Assessment) Basic ADL Feeding: Independent Oral Facial Hygiene/Grooming: Setup Bathing: Minimum assistance Upper Body Dressing: Setup Lower Body Dressing: Moderate assistance Toileting: Moderate assistance Grooming/Bathing/Dressing Activities of Daily Living Functional Transfers Bathroom Mobility: Minimum assistance Toilet Transfer : Moderate assistance Shower Transfer: Moderate assistance Bed/Mat Mobility Supine to Sit: Minimum assistance Sit to Stand: Minimum assistance Bed to Chair: Minimum assistance Physical Skills Involved: 1. Range of Motion 2. Balance 3. Strength Cognitive Skills Affected (resulting in the inability to perform in a timely and safe manner): 
1. [facility] Psychosocial Skills Affected: 
1. WFL Number of elements that affect the Plan of Care: 1-3:  LOW COMPLEXITY CLINICAL DECISION MAKIN [facility] [address] [address] AM-PAC 6 Clicks Daily Activity Inpatient Short Form How much help from another person does the patient currently need. .. Total A Lot A Little None 1. Putting on and taking off regular lower body clothing? [] 1   [x] 2   [] 3   [] 4  
2. Bathing (including washing, rinsing, drying)?    [] 1   [x] 2   [] 3   [] 4  
 3.  Toileting, which includes using toilet, bedpan or urinal?   [] 1   [x] 2   [] 3   [] 4  
4. Putting on and taking off regular upper body clothing? [] 1   [] 2   [] 3   [x] 4  
5. Taking care of personal grooming such as brushing teeth? [] 1   [] 2   [] 3   [x] 4  
6. Eating meals? [] 1   [] 2   [] 3   [x] 4  
© 2007, Trustees of [address] [place] [address] [address] [address], under license to [employer]. All rights reserved Score:  Initial: 18 Most Recent: X (Date: -- ) Interpretation of Tool:  Represents activities that are increasingly more difficult (i.e. Bed mobility, Transfers, Gait). Score 24 23 22-20 19-15 14-10 9-7 6 Modifier CH CI CJ CK CL CM CN   
 
? Self Care:  
  - CURRENT STATUS: CK - 40%-59% impaired, limited or restricted  - GOAL STATUS: CJ - 20%-39% impaired, limited or restricted  - D/C STATUS:  ---------------To be determined--------------- [place] / [place] / Product Type: Medicare /   
 
Medical Necessity:    
· Skilled intervention continues to be required due to Deficits listed above. Reason for Services/Other Comments: 
· Patient continues to require skilled intervention due to new DIANE. Use of outcome tool(s) and clinical judgement create a POC that gives a: MODERATE COMPLEXITY  
  
 
 
 
TREATMENT:  
(In addition to Assessment/Re-Assessment sessions the following treatments were rendered) Pre-treatment Symptoms/Complaints:   
Pain: Initial:  
Pain Intensity 1: 3  Post Session:  3 Therapeutic Activity: (    13): Therapeutic activities including Bed transfers, Chair transfers and Ambulation on level ground to improve mobility and balance. Required minimal   to promote motor control of bilateral, upper extremity(s), lower extremity(s). Initial evaluation 5 minutes Treatment/Session Assessment:   
 Response to Treatment:  Good, sitting up in recliner. Education: 
[] Home Exercises [x] Fall Precautions [x] Hip Precautions [] Going Home Video 
[] Knee/Hip Prosthesis Review [x] Walker Management/Safety [x] Adaptive Equipment as Needed Interdisciplinary Collaboration:  
o Physical Therapist 
o Occupational Therapist 
o Registered Nurse After treatment position/precautions:  
o Up in chair 
o Bed/Chair-wheels locked 
o Caregiver at bedside 
o Call light within reach 
o RN notified Compliance with Program/Exercises: compliant all of the time. Recommendations/Intent for next treatment session:  Treatment next visit will focus on increasing Mr. Estradas independence with bed mobility, transfers, self care, functional mobility, modalities for pain, and patient education. Total Treatment Duration: OT Patient Time In/Time Out Time In: 1630 Time Out: 8890 Al Mendosa OT

## 2018-10-03 NOTE — PERIOP NOTES
Teach back method used with patient concerning hibiclens wash, TB screening, incentive spirometer(1500 preop), and pain management goals. Patient and family were provided with home discharge needs list. Patient's daughter concerned that patient may be getting sick last night/getting the flu because patient told his daughter that he was having chills. Denies complaints today. Afebrile.

## 2018-10-03 NOTE — OP NOTES
Total Hip Procedure Note    Bala Saha,  [id number],  1945    Pre-operative Diagnosis:  Idiopathic aseptic necrosis of femur, left (HCC) [M87.052]    Post-operative Diagnosis: Idiopathic aseptic necrosis of femur, left (HCC) [M87.052]    Procedure: Left Total Hip Arthroplasty    BMI: Body mass index is 24.05 kg/(m^2). [facility] Location: [address] [place] [address]    Surgeon: Michelle Larson MD    Assistant: Mark Anthony Eduardo CFA    Anesthesia: Spinal     Complications: None    EBL: 200cc    Drains: None    Intra-op Findings: Pre-operatively leg lengths were assessed using preop Xrays and with the patient in the lateral decubitus position and operative leg was felt to be 1.5cm shorter in length compared to the contralateral leg. The operative hip showed notable cartilage loss of both the femoral head and acetabulum. No intra-operative periprosthetic fracture was encountered. Sciatic nerve was noted to be intact at the end of the procedure. The overall length replaced with the implanted head/neck was 35mm. Intra-operatively we felt that we restored the patients leg lengths to equal lengths using the method described later. Postop with the patient supine we assessed leg lengths and felt they were similar. Patient condition at completion of Procedure: Stable    Implants:   Implant Name Type Inv.  Item Serial No.  Lot No. LRB No. Used   Trident II Tritanium clusterhole Acetabular Shell   [id number] [employer] [id number] Left 1   6.5mm low profile hex screw   85FE [employer] 85FE Left 1   6.5mm low profile hex screw   85BH [employer] 85BH Left 1   6.5mm low profile hex screw   7LU [employer] 7LU Left 1   LINER ACET TRIDENT 10 DEG SZ E --  - [id number]  LINER ACET TRIDENT 10 DEG SZ E --  [id number] [employer] [facility] [id number] Left 1   STEM FEM SZ 7 132D [id number] -- ACCOLADE II V40 - [id number]  STEM FEM SZ 7 132D [id number] -- ACCOLADE II V40 [id number] [employer] [facility] [id number] Left 1   HEAD FEM DELT V40 +0MM NK 36MM -- V40 BIOLOX - [id number]   HEAD FEM DELT V40 +0MM NK 36MM -- V40 BIOLOX [id number] [employer] [facility] [id number] Left 1       Description of Procedure    The complexity of the total joint surgery requires the use of a first assistant for positioning, retraction and assistance in closure. Jessica Leone was brought to the operating room. Patient was transferred from the stretcher to OR bed. Spinal anesthetic was induced. Rivero catheter was placed. IV antibiotics were administered per protocol. Jessica Leone was positioned in the lateral decubitus position and the pelvis/torso stabilized with posts. The limb was prepped and draped in the usual sterile fashion. A time out identifying the patient, procedure, operative side and surgeon was administered and charted by the OR Nurse. Prior to incision one gram of TXA was given intravenously. A standard posterior approach was utilized to expose the hip. The incision was carried through the subcutaneous tissue and underlying fascia with hemostasis obtained using the bovie cauterization and Aquamantys. A Charmley retractor was inserted. We resected any redundent bursal tissue off the external rotators. We were able to identify the piriformis tendon. The short external rotators and capsule were dissected off the posterior femur as a single layer just superior to the piriformis tendon. The sciatic nerve was palpated and protected during dissection. The femoral head was dislocated. The articular surface revealed loss of cartilage, exposed bone and bone spurring. The neck was osteotomized approximately 1cm above the top of the lesser trochanter. We were careful to protect the greater trochanter during osteotomy and protect it from iatrogenic injury. Acetabular retractors were placed both anterior and posterior just superficial to the acetabular labrum.   Remaining acetabular labrum was resected and any soft tissue was removed from the acetabulum including any tissue within the codyloid fossa. The acetabular surface revealed loss of cartilage with exposed bone. The acetabulum was sequentially reamed noting proper anteversion and inclination during reaming. The transverse acetabular ligament was used as the primary anatomic landmark to determine version and inclination. The acetabulum was sized to a 54 mm acetabular component. The prepared acetabulum was irrigated of any residual reamings and soft tissue. The permanent acetabular component was impacted into place to achieve appropriate horizontal tilt, anteversion, bone coverage and stability. We visualize that the acetabular component was fully seated. A trial liner was impacted into position. We did not have to excise overhanging osteophytes anterior and posterior  in order to minimize the risk of impingement. We then turned our attention to the proximal femur. A 2-prong proximal femoral retractor was placed. We gained access to the proximal femur using a  and femoral canal finder. The canal was prepared with appropriate lateralization in which we used the initial smaller broaches to remove lateral bone to avoid varus placement of the femoral component. The canal was then broached progressively. The broach was positioned with the appropriate anatomic femoral anteversion. We broached up to a size 7 Accolade II stem. A calcar planar was used. A trial reduction with a size #7 132 degree Accolade II stem with a +0 neck length and 36 mm head was performed. This was found to be the most stable to flexion greater than 90 degrees and on internal and external rotation. Limb lengths were assessed using three techniques. First, the position of the tip of the operative greater trochanter was compared to the center of the femoral head component and was felt to match this same anatomic relationship as the non-operative hip based on preoperative X-rays.  Next, we measured the length of our resected femoral head neck and felt that the trial components matched this resected length as closely as possible when accounting for the length provided by the femoral neck/head. Finally, we compared leg lengths by comparing the possible of the patella with the patients heels together with the patient in the lateral decubitus position. Using these methods it was felt that the patients leg lengths were equilibrated and with appropriate stability as mentioned above. All trial components were removed. Prior to final polyethylene insertion three screw was placed to further stabilize the cup. The final liner was impacted into place which was a 10 degree elevated liner. A cementless size 7 132 degree Accolade II stem was impacted into place carefully. We were careful to observe the calcar region for any iatrogenic fractures during insertion. The permanent femoral head was impacted into place which was a +0mm 36mm ceramic head. Viricandis Livingston Escalante's hip was reduced and stability was as mentioned above. Dilute Betadine solution was allowed to sit in the surgical site for a 3 minute period and copious saline was used to irrigate the surgical site. The sciatic nerve was palpated and noted to be intact. A periarticular of ropivicaine, toradol, and morphine was injected about the surgical site with care being taken not to inject in the vicinity of neurovascular structures. Prior to wound closure one additional gram of TXA was given for a total of 2 grams. The capsule was repaired with a three #2 Fiberwires secured through drill holes placed in the posterior aspect of the trochanter. No drain was placed. The fascia was closed with a  #0 Bidirectional Stratafix barbed suture. The sub-Q layer was closed with 2-0 Vicryl suture and a  3-0 moncril stratafix suture in a running subcuticular fashion was used to close the skin.  The incision site was thoroughly cleaned with alcohol and the Exofin wound closure system was applied to seal it off from external contamination after the overlying skin was thoroughly cleaned with alcohol. A thin layer of [place] lubricant was applied over the wound to keep the dressing from adhering to the overlying sterile bandage and said bandage was placed. Drapes were then broken down and patient was transferred carefully back to the OR stretcher. The sponge and needle counts were correct. The patient tolerated the procedure without difficulty and left the operating room in satisfactory condition.     Signed By: Radhika Chan MD

## 2018-10-04 LAB
ANION GAP SERPL CALC-SCNC: 6 MMOL/L
BUN SERPL-MCNC: 12 MG/DL (ref 8–23)
CALCIUM SERPL-MCNC: 8.2 MG/DL (ref 8.3–10.4)
CHLORIDE SERPL-SCNC: 104 MMOL/L (ref 98–107)
CO2 SERPL-SCNC: 32 MMOL/L (ref 21–32)
CREAT SERPL-MCNC: 0.95 MG/DL (ref 0.8–1.5)
GLUCOSE SERPL-MCNC: 116 MG/DL (ref 65–100)
HGB BLD-MCNC: 10.9 G/DL (ref 13.6–17.2)
POTASSIUM SERPL-SCNC: 3.9 MMOL/L (ref 3.5–5.1)
SODIUM SERPL-SCNC: 142 MMOL/L (ref 136–145)

## 2018-10-04 PROCEDURE — 97110 THERAPEUTIC EXERCISES: CPT

## 2018-10-04 PROCEDURE — 65270000029 HC RM PRIVATE

## 2018-10-04 PROCEDURE — 97535 SELF CARE MNGMENT TRAINING: CPT

## 2018-10-04 PROCEDURE — 74011250637 HC RX REV CODE- 250/637: Performed by: ORTHOPAEDIC SURGERY

## 2018-10-04 PROCEDURE — 85018 HEMOGLOBIN: CPT

## 2018-10-04 PROCEDURE — 94762 N-INVAS EAR/PLS OXIMTRY CONT: CPT

## 2018-10-04 PROCEDURE — 94760 N-INVAS EAR/PLS OXIMETRY 1: CPT

## 2018-10-04 PROCEDURE — 97116 GAIT TRAINING THERAPY: CPT

## 2018-10-04 PROCEDURE — 97150 GROUP THERAPEUTIC PROCEDURES: CPT

## 2018-10-04 PROCEDURE — 36415 COLL VENOUS BLD VENIPUNCTURE: CPT

## 2018-10-04 PROCEDURE — 74011250636 HC RX REV CODE- 250/636: Performed by: ORTHOPAEDIC SURGERY

## 2018-10-04 PROCEDURE — 80048 BASIC METABOLIC PNL TOTAL CA: CPT

## 2018-10-04 RX ORDER — HYDROCODONE BITARTRATE AND ACETAMINOPHEN 10; 325 MG/1; MG/1
1 TABLET ORAL 3 TIMES DAILY
Qty: 42 TAB | Refills: 0 | Status: SHIPPED | OUTPATIENT
Start: 2018-10-04

## 2018-10-04 RX ORDER — ACETAMINOPHEN 500 MG
1000 TABLET ORAL EVERY 6 HOURS
Qty: 120 TAB | Refills: 0 | Status: SHIPPED | OUTPATIENT
Start: 2018-10-04

## 2018-10-04 RX ORDER — ONDANSETRON 8 MG/1
8 TABLET, ORALLY DISINTEGRATING ORAL
Qty: 60 TAB | Refills: 0 | Status: SHIPPED | OUTPATIENT
Start: 2018-10-04

## 2018-10-04 RX ORDER — AMOXICILLIN 250 MG
2 CAPSULE ORAL DAILY
Qty: 120 TAB | Refills: 0 | Status: SHIPPED | OUTPATIENT
Start: 2018-10-04

## 2018-10-04 RX ORDER — ASPIRIN 81 MG/1
81 TABLET ORAL EVERY 12 HOURS
Qty: 60 TAB | Refills: 0 | Status: SHIPPED | OUTPATIENT
Start: 2018-10-04

## 2018-10-04 RX ORDER — CYCLOBENZAPRINE HCL 10 MG
5 TABLET ORAL 3 TIMES DAILY
Qty: 30 TAB | Refills: 0 | Status: SHIPPED | OUTPATIENT
Start: 2018-10-04

## 2018-10-04 RX ADMIN — CALCIUM CARBONATE-VITAMIN D TAB 500 MG-200 UNIT 1 TABLET: 500-200 TAB at 08:12

## 2018-10-04 RX ADMIN — Medication 1 AMPULE: at 20:45

## 2018-10-04 RX ADMIN — Medication 1 AMPULE: at 08:10

## 2018-10-04 RX ADMIN — CELECOXIB 200 MG: 200 CAPSULE ORAL at 08:11

## 2018-10-04 RX ADMIN — GABAPENTIN 600 MG: 300 CAPSULE ORAL at 08:11

## 2018-10-04 RX ADMIN — OXYCODONE HYDROCHLORIDE 5 MG: 5 TABLET ORAL at 08:13

## 2018-10-04 RX ADMIN — ACETAMINOPHEN 1000 MG: 500 TABLET, FILM COATED ORAL at 18:30

## 2018-10-04 RX ADMIN — Medication 2 G: at 03:48

## 2018-10-04 RX ADMIN — ASPIRIN 81 MG: 81 TABLET, COATED ORAL at 08:11

## 2018-10-04 RX ADMIN — CELECOXIB 200 MG: 200 CAPSULE ORAL at 20:41

## 2018-10-04 RX ADMIN — ACETAMINOPHEN 1000 MG: 500 TABLET, FILM COATED ORAL at 06:41

## 2018-10-04 RX ADMIN — OXYCODONE HYDROCHLORIDE 5 MG: 5 TABLET ORAL at 18:30

## 2018-10-04 RX ADMIN — CYANOCOBALAMIN TAB 1000 MCG 1000 MCG: 1000 TAB at 08:12

## 2018-10-04 RX ADMIN — SENNOSIDES AND DOCUSATE SODIUM 2 TABLET: 8.6; 5 TABLET ORAL at 08:12

## 2018-10-04 RX ADMIN — ASPIRIN 81 MG: 81 TABLET, COATED ORAL at 20:41

## 2018-10-04 RX ADMIN — OXYCODONE HYDROCHLORIDE 5 MG: 5 TABLET ORAL at 12:12

## 2018-10-04 RX ADMIN — GABAPENTIN 1200 MG: 400 CAPSULE ORAL at 20:41

## 2018-10-04 RX ADMIN — METFORMIN HYDROCHLORIDE 500 MG: 500 TABLET ORAL at 08:11

## 2018-10-04 NOTE — PROGRESS NOTES
Problem: Mobility Impaired (Adult and Pediatric) Goal: *Acute Goals and Plan of Care (Insert Text) GOALS (1-4 days): 
(1.)Mr. Morris Calvo will move from supine to sit and sit to supine  in bed with SUPERVISION. (2.)Mr. Morris Calvo will transfer from bed to chair and chair to bed with STAND BY ASSIST using the least restrictive device. (3.)Mr. Morris Calvo will ambulate with STAND BY ASSIST for 200 feet with the least restrictive device. (4.)Mr. Morris Calvo will ambulate up/down 7 steps with right railing with CONTACT GUARD ASSIST with no device. (5.)Mr. Morris Calvo will state/observe TAJ precautions with No verbal cues. ________________________________________________________________________________________________ PHYSICAL THERAPY [facility]: Daily Note, PM 10/4/2018 INPATIENT: Hospital Day: 2 [place] / Product Type: Medicare /  
  
NAME/AGE/GENDER: Harrison Burgos is a 74 y.o. male PRIMARY DIAGNOSIS:  Idiopathic aseptic necrosis of femur, left ([facility] [M87.052] Procedure(s) and Anesthesia Type: 
   * LEFT TOTAL HIP ARTHROPLASTY - Spinal (Left) ICD-10: Treatment Diagnosis:  
 · Pain in left hip (M25.552) · Stiffness of Left Hip, Not elsewhere classified (M25.652) · Difficulty in walking, Not elsewhere classified (R26.2) ASSESSMENT:  
 
Mr. Morris Calvo presents with impaired strength & mobility s/p left TAJ. Pt also had decreased stability during out of bed activity. Pt will benefit from follow up therapy to help restore safe function prior to returning home with caregiver. If pt does not have a capable caregiver to assist on hospital DC, this pt will need a post acute rehab stay prior to returning home. He walks to the gym and back and back without any problems. While in the gym he performs exercises without any increase in pain. This section established at most recent assessment PROBLEM LIST (Impairments causing functional limitations): 1. Decreased Strength 2. Decreased ADL/Functional Activities 3. Decreased Transfer Abilities 4. Decreased Ambulation Ability/Technique 5. Decreased Balance 6. Increased Pain 7. Decreased Activity Tolerance 8. Decreased Knowledge of Precautions 9. Decreased [place] with Home Exercise Program 
 INTERVENTIONS PLANNED: (Benefits and precautions of physical therapy have been discussed with the patient.) 1. Bed Mobility 2. Gait Training 3. Home Exercise Program (HEP) 4. Therapeutic Exercise/Strengthening 5. Transfer Training 6. Range of Motion: active/assisted/passive 7. Therapeutic Activities 8. Group Therapy TREATMENT PLAN: Frequency/Duration: Follow patient BID for duration of hospital stay to address above goals. Rehabilitation Potential For Stated Goals: Good RECOMMENDED REHABILITATION/EQUIPMENT: (at time of discharge pending progress): Continue Skilled Therapy and Rehab. HISTORY:  
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 The patient was evaluated and examined in the office prior to today and was found to have a history and physical exam consistent with intra-articular pathology of the left hip. Patient complains of anterior groin pain predominately. Pain occurs during activity. Patient has difficulty putting on socks/shoes and has notable pain when getting up from a chair and getting out of a car. Patient does not complain of significant lateral hip pain or radicular pain down the leg. There have been no changes to the patient's orthopedic condition since the last office visit Past Medical History/Comorbidities:  
Mr. Rom Funes  has a past medical history of Cancer ([address] Chronic pain; Constipation; DDD (degenerative disc disease), lumbar; Diabetes ([address] GERD (gastroesophageal reflux disease); Hypercholesteremia; Kidney stones; Nausea & vomiting; Scoliosis; and Spinal stenosis.  He also has no past medical history of Adverse effect of anesthesia; Difficult intubation; Malignant hyperthermia due to anesthesia; or Pseudocholinesterase deficiency. Mr. Nghia Leavitt  has a past surgical history that includes hx hernia repair; hx urological; hx other surgical; and hx other surgical. 
Social History/Living Environment:  
Home Environment: Private residence # Steps to Enter: 7 Rails to Enter: Yes Hand Rails : Right One/Two Story Residence: One story Living Alone: No 
Support Systems: Family member(s) Patient Expects to be Discharged to[de-identified] Rehabilitation facility Current DME Used/Available at Home: Linford Armen Tub or Shower Type: Shower Prior Level of Function/Work/Activity: Pt was functioning with a cane out of the home & a eliza-walker in the home prior to this admission. Number of Personal Factors/Comorbidities that affect the Plan of Care: 3+: HIGH COMPLEXITY EXAMINATION:  
Most Recent Physical Functioning:  
  
  
 
         
 
  
 
  
 
Transfers Sit to Stand: Contact guard assistance Stand to Sit: Contact guard assistance Bed to Chair: Contact guard assistance Balance Sitting: Intact Weight Bearing Status Left Side Weight Bearing: As tolerated Distance (ft): 132 Feet (ft) (x 2) Ambulation - Level of Assistance: Contact guard assistance Assistive Device: Walker, rolling Speed/Paola: Delayed Step Length: Right shortened Stance: Left decreased Gait Abnormalities: Decreased step clearance Braces/Orthotics: none Left Hip Cold Type: Cold/ice pack Body Structures Involved: 1. Joints 2. Muscles Body Functions Affected: 1. Sensory/Pain 2. Movement Related Activities and Participation Affected: 1. General Tasks and Demands 2. Mobility Number of elements that affect the Plan of Care: 4+: HIGH COMPLEXITY CLINICAL PRESENTATION:  
Presentation: Stable and uncomplicated: LOW COMPLEXITY CLINICAL DECISION MAKING:  
[employer] 6 Clicks Basic Mobility Inpatient Short Form How much difficulty does the patient currently have. .. Unable A Lot A Little None 1. Turning over in bed (including adjusting bedclothes, sheets and blankets)? [] 1   [] 2   [x] 3   [] 4  
2. Sitting down on and standing up from a chair with arms ( e.g., wheelchair, bedside commode, etc.)   [] 1   [] 2   [x] 3   [] 4  
3. Moving from lying on back to sitting on the side of the bed? [] 1   [] 2   [x] 3   [] 4 How much help from another person does the patient currently need. .. Total A Lot A Little None 4. Moving to and from a bed to a chair (including a wheelchair)? [] 1   [] 2   [x] 3   [] 4  
5. Need to walk in hospital room? [] 1   [] 2   [x] 3   [] 4  
6. Climbing 3-5 steps with a railing? [x] 1   [] 2   [] 3   [] 4  
© 2007, Trustees of [address] [place] [address], under license to [employer]. All rights reserved Score:  Initial: 16 Most Recent: X (Date: -- ) Interpretation of Tool:  Represents activities that are increasingly more difficult (i.e. Bed mobility, Transfers, Gait). Score 24 23 22-20 19-15 14-10 9-7 6 Modifier CH CI CJ CK CL CM CN   
 
? Mobility - Walking and Moving Around:  
  - CURRENT STATUS: CK - 40%-59% impaired, limited or restricted  - GOAL STATUS: CJ - 20%-39% impaired, limited or restricted  - D/C STATUS:  ---------------To be determined--------------- [place] / [place] / Product Type: Medicare /   
 
Medical Necessity:    
· Patient is expected to demonstrate progress in strength, balance, coordination and functional technique to decrease assistance required with bed mobility, transfers & gait. Reason for Services/Other Comments: 
· Patient continues to require skilled intervention due to pt not safe with functional mobility. Use of outcome tool(s) and clinical judgement create a POC that gives a: Clear prediction of patient's progress: LOW COMPLEXITY TREATMENT:  
(In addition to Assessment/Re-Assessment sessions the following treatments were rendered) Pre-treatment Symptoms/Complaints:  none Pain: Initial: visual scale Pain Intensity 1: 0  Post Session:    
 
Therapeutic Exercise: (45 Minutes (group therapy)):  Exercises per grid below to improve mobility, strength, balance, coordination and dynamic movement of leg - left to improve functional endurance. Required minimal verbal cues to promote proper body alignment and promote proper body mechanics. Progressed repetitions as indicated. Gait Training (15 Minutes):  Gait training to improve and/or restore physical functioning as related to mobility. Ambulated 132 Feet (ft) (x 2) with Contact guard assistance using a Walker, rolling and minimal   related to their hip position and motion to promote proper body alignment. Date: 
10/3 Date: 
10/4 Date: 
  
ACTIVITY/EXERCISE AM PM AM PM AM PM  
GROUP THERAPY  []  []  []  [x]  []  [] Ankle Pumps  10 15 15 Quad Sets  10 15 15 Gluteal Sets  10 15 15 Hip ABd/ADduction  10 15 15 Straight Leg Raises  --      
Knee Slides  10 15 15 Short Arc Quads  10 15 15 [address]  10  15 Chair Slides B = bilateral; AA = active assistive; A = active; P = passive Treatment/Session Assessment:   
 Response to Treatment:  Tolerated group therapy Education: 
[x] Home Exercises [x] Fall Precautions [x] Hip Precautions [] D/C Instruction Review 
[] Knee/Hip Prosthesis Review [x] Walker Management/Safety [] Adaptive Equipment as Needed Interdisciplinary Collaboration:  
o Registered Nurse 
o Certified Nursing Assistant/Patient Care Technician After treatment position/precautions:  
o Up in chair 
o Caregiver at bedside 
o Call light within reach 
o RN notified 
o Family at bedside Compliance with Program/Exercises: Will assess as treatment progresses. Recommendations/Intent for next treatment session:  Treatment next visit will focus on increasing Mr. Escalante's independence with bed mobility, transfers, gait training, strength/ROM exercises, modalities for pain, and patient education. Total Treatment Duration: PT Patient Time In/Time Out Time In: 1300 Time Out: 1400 Guerita Campoverde, GIANNA

## 2018-10-04 NOTE — PROGRESS NOTES
Spiritual Care initial visit made by [address] Prayer and support given. Card left. LAKESHA Gandhi / Bereavement Coordinator

## 2018-10-04 NOTE — PROGRESS NOTES
10/04/18 5987 Oxygen Therapy O2 Sat (%) 96 % Pulse via Oximetry 70 beats per minute O2 Device Nasal cannula O2 Flow Rate (L/min) 1 l/min 
(pt. weaned to RA.) Follow up from initial assessment SOB evaluated:none Patient resting comfortably, denies any discomfort at this time.

## 2018-10-04 NOTE — DISCHARGE SUMMARY
REHABILITATION DISCHARGE SUMMARY     Patient: Jessica Leone MRN: [id number]  SSN: [id number]    YOB: 1945  Age: 74 y.o. Sex: male      Date: 10/4/2018  Admit Date: 10/3/2018  Discharge Date: 10/4/2018    Admitting Physician: Chantal Trevino MD   Primary Care Physician: Not On File Bsh[facility]     Admission Condition: good    Chief Complaint : Gait dysfunction secondary to below. Admit Diagnosis: Idiopathic aseptic necrosis of femur, left ([address] [M87.052]  left  total hip arthroplasty   10/3/2018  Pain  DVT risk  Post op hemorrhagic anemia  DDD  DM  Acute Rehab Dx:  Gait impairment  Debility    Mobility and ambulation deficits  Self Care/ADL deficits       Jessica Leone had painful left hip due to severe DJD, AVN that has been refractory to conservative management. The symptoms were aggravated by weight bearing, walking and activity, limiting daily function. Patient underwent a left DIANE per Dr. Chantal Trevino MD on 10/3/2018. The patient's ayaz operative course has been uneventful. Patient is to be WBAT LLE. Hip precautions are to be followed strictly. Patient is starting to work with acute PT, OT, standing, taking steps with minimum assistance so far. Patient still shows significant functional deficits due to right hip pain, decreased ROM and poor strength. Jessica Leone is seen and examined with ambulation using a eliza-walker for walking prior to admission, but limited by hip pain.     O.K.to transfer to rehab today.    Rehabiitation Course:     Home Architecture: Home Situation  Home Environment: Private residence (10/04/18 0900)  # Steps to Enter: 7 (10/03/18 1700)  Rails to Enter: Yes (10/03/18 1700)  Hand Rails : Right (10/03/18 1700)  One/Two Story Residence: One story (10/04/18 0900)  Living Alone: No (10/04/18 0900)  Support Systems: Family member(s) (10/04/18 0900)  Patient Expects to be Discharged to[de-identified] Rehabilitation facility (10/04/18 0900)  Current DME Used/Available at Home: Yary Banegas (10/04/18 0900)  Tub or Shower Type: Shower (10/03/18 1700)     Past Medical History:   Diagnosis Date    Cancer ([facility] [address]     bladder    Chronic pain     back, hip    Constipation     DDD (degenerative disc disease), lumbar     Diabetes ([facility] [address]     pt reports borderline-does not check FBS    GERD (gastroesophageal reflux disease)     controlled with med    Hypercholesteremia     Kidney stones     Nausea & vomiting     request preop med    Scoliosis     Spinal stenosis       Past Surgical History:   Procedure Laterality Date    HX HERNIA REPAIR      HX OTHER SURGICAL      pilonidal cyst removal    HX OTHER SURGICAL      facial reconstruction    HX UROLOGICAL      bladder      History reviewed. No pertinent family history. Social History   Substance Use Topics    Smoking status: Former Smoker    Smokeless tobacco: Never Used    Alcohol use No       No Known Allergies    Prior to Admission medications    Medication Sig Start Date End Date Taking? Authorizing Provider   HYDROcodone-acetaminophen (NORCO)  mg tablet Take 1 Tab by mouth three (3) times daily. Max Daily Amount: 3 Tabs. Indications: Pain 10/4/18  Yes Jorge Desir MD   acetaminophen (TYLENOL) 500 mg tablet Take 2 Tabs by mouth every six (6) hours. 10/4/18  Yes Jorge Desir MD   aspirin delayed-release 81 mg tablet Take 1 Tab by mouth every twelve (12) hours every twelve (12) hours. 10/4/18  Yes Jorge Desir MD   cyclobenzaprine (FLEXERIL) 10 mg tablet Take 0.5 Tabs by mouth three (3) times daily. 10/4/18  Yes Jorge Desir MD   ondansetron (ZOFRAN ODT) 8 mg disintegrating tablet Take 1 Tab by mouth every eight (8) hours as needed for Nausea. 10/4/18  Yes Jorge Desir MD   senna-docusate (PERICOLACE) 8.6-50 mg per tablet Take 2 Tabs by mouth daily. 10/4/18  Yes Jorge Desir MD   raNITIdine hcl 150 mg capsule Take 150 mg by mouth two (2) times a day.  Indications: gastroesophageal reflux disease   Yes Historical Provider celecoxib (CELEBREX) 200 mg capsule Take 200 mg by mouth daily. Indications: OSTEOARTHRITIS   Yes Historical Provider   oxyCODONE ER [facility] ER) 9 mg capsule Take 9 mg by mouth every twelve (12) hours. Indications: Severe Pain   Yes Historical Provider   gabapentin (NEURONTIN) 600 mg tablet Take 600 mg by mouth three (3) times daily. 1 tablet AM and mid-day, 2 tablets at night   Indications: pain   Yes Historical Provider   metFORMIN (GLUMETZA ER) 500 mg TG24 24 hour tablet Take  by mouth daily. Indications: PREVENTION OF TYPE 2 DIABETES MELLITUS   Yes Historical Provider   pravastatin (PRAVACHOL) 40 mg tablet Take 40 mg by mouth nightly. Indications: hypercholesterolemia   Yes Historical Provider   docusate sodium (COLACE) 100 mg capsule Take 100 mg by mouth nightly. Indications: constipation   Yes Historical Provider   ibuprofen (MOTRIN) 200 mg tablet Take  by mouth every six (6) hours as needed for Pain. Stop 5 days prior to surgery per anesthesia guidelines. Historical Provider   cyanocobalamin (VITAMIN B-12) 1,000 mcg tablet Take 1,000 mcg by mouth daily. Historical Provider   calcium-cholecalciferol, D3, (CALTRATE 600+D) tablet Take 1 Tab by mouth daily.     Historical Provider       Current Medications:  Current Facility-Administered Medications   Medication Dose Route Frequency Provider Last Rate Last Dose    calcium-vitamin D (OS-FAUSTO) 500 mg-200 unit tablet  1 Tab Oral DAILY Torsten Velasquez MD   1 Tab at 10/04/18 9974    cyanocobalamin tablet 1,000 mcg  1,000 mcg Oral DAILY Torsten Velasquez MD   1,000 mcg at 10/04/18 6661    gabapentin (NEURONTIN) capsule 600 mg  600 mg Oral BID Torsten Velasquez MD   600 mg at 10/04/18 8496    metFORMIN (GLUCOPHAGE) tablet 500 mg  500 mg Oral DAILY Torsten Velasquez MD   500 mg at 10/04/18 0811    pravastatin (PRAVACHOL) tablet 40 mg  40 mg Oral QHS Torsten Velasquez MD   40 mg at 10/03/18 2251    alcohol 62% (NOZIN) nasal  1 Ampule  1 Ampule Topical Q12H Darnell Angeles MD   1 Ampule at 10/04/18 0810    0.9% sodium chloride infusion  100 mL/hr IntraVENous CONTINUOUS Darnell Angeles MD        sodium chloride (NS) flush 5-10 mL  5-10 mL IntraVENous PRN Darnell Angeles MD        acetaminophen (TYLENOL) tablet 1,000 mg  1,000 mg Oral Q6H Darnell Angeles MD   1,000 mg at 10/04/18 0641    celecoxib (CELEBREX) capsule 200 mg  200 mg Oral Q12H Darnell Angeles MD   200 mg at 10/04/18 0811    naloxone (NARCAN) injection 0.2-0.4 mg  0.2-0.4 mg IntraVENous Q10MIN PRN Darnell Angeles MD        diphenhydrAMINE (BENADRYL) capsule 25 mg  25 mg Oral Q4H PRN Darnell Angeles MD        aspirin delayed-release tablet 81 mg  81 mg Oral Q12H Darnell Angeles MD   81 mg at 10/04/18 0811    oxyCODONE IR (ROXICODONE) tablet 5 mg  5 mg Oral Q3H PRN Darnell Angeles MD   5 mg at 10/04/18 1212    HYDROmorphone (PF) (DILAUDID) injection 1 mg  1 mg IntraVENous Q3H PRN Darnell Angeles MD        senna-docusate (PERICOLACE) 8.6-50 mg per tablet 2 Tab  2 Tab Oral DAILY Darnell Angeles MD   2 Tab at 10/04/18 1520    cyclobenzaprine (FLEXERIL) tablet 5 mg  5 mg Oral TID Darnell Angeles MD   Stopped at 10/04/18 0900    ondansetron (ZOFRAN ODT) tablet 8 mg  8 mg Oral Q8H PRN Darnell Angeles MD        oxyCODONE ER [facility]) capsule 9 mg (Patient Supplied)  9 mg Oral Q12H Darnell Angeles MD   9 mg at 10/03/18 2100    gabapentin (NEURONTIN) capsule 1,200 mg  1,200 mg Oral QHS Darnell Angeles MD   1,200 mg at 10/03/18 2251        Review of Systems: Denies chest pain, shortness of breath, cough, headache, visual problems, abdominal pain, dysurea, calf pain. Pertinent positives are as noted in the medical records and unremarkable otherwise. Vital Signs: Patient Vitals for the past 8 hrs:   Temp Pulse Resp SpO2   10/04/18 1142 97.4 °F (36.3 °C) 75 18 90 %        Physical Exam:   General: Alert and age appropriately oriented. No acute cardio respiratory distress. HEENT: Normocephalic.  Oral mucosa moist without cyanosis. Lungs: Clear to auscultation  bilaterally. Respiration even and unlabored   Heart: Regular rate and rhythm, S1, S2   No  murmurs, clicks, rub or gallops   Abdomen: Soft, non-tender, nondistended. Bowel sounds present   Genitourinary: defered   Neuromuscular:      Grossly no focal neurological deficits noted. L Ankle dorsiflexion 5/5   L Ankle plantarflexion 5/5  R Ankle dorsiflexion 5/5   R Ankle plantarflexion 5/5  No sensory deficits. Skin/extremity: No rashes, no erythema. Calves soft. Wound covered.      Skin Incision(s)/Wound(s):        Lab Review:   Recent Results (from the past 72 hour(s))   TYPE & SCREEN    Collection Time: 10/03/18 10:42 AM   Result Value Ref Range    Crossmatch Expiration 10/06/2018     ABO/Rh(D) [place] POSITIVE     Antibody screen NEG    GLUCOSE, POC    Collection Time: 10/03/18 10:48 AM   Result Value Ref Range    Glucose (POC) 121 (H) 65 - 100 mg/dL   HEMOGLOBIN    Collection Time: 10/03/18  8:07 PM   Result Value Ref Range    HGB 11.3 (L) 13.6 - 17.2 g/dL   HEMOGLOBIN    Collection Time: 10/04/18  4:19 AM   Result Value Ref Range    HGB 10.9 (L) 13.6 - 60.8 g/dL   METABOLIC PANEL, BASIC    Collection Time: 10/04/18  4:19 AM   Result Value Ref Range    Sodium 142 136 - 145 mmol/L    Potassium 3.9 3.5 - 5.1 mmol/L    Chloride 104 98 - 107 mmol/L    CO2 32 21 - 32 mmol/L    Anion gap 6 mmol/L    Glucose 116 (H) 65 - 100 mg/dL    BUN 12 8 - 23 MG/DL    Creatinine 0.95 0.8 - 1.5 MG/DL    GFR est AA >60 >60 ml/min/1.73m2    GFR est non-AA >60 ml/min/1.73m2    Calcium 8.2 (L) 8.3 - 10.4 MG/DL       PT Initial  PT Most Recent   AROM: Generally decreased, functional (right LE) (10/03/18 1700) Generally decreased, functional (right LE) (10/03/18 1700)         Strength: Generally decreased, functional (right LE) (10/03/18 1700) Generally decreased, functional (right LE) (10/03/18 1700)   Coordination: Generally decreased, functional (right LE) (10/03/18 1700) Generally decreased, functional (right LE) (10/03/18 1700)                     Distance (ft): 60 Feet (ft) (10/03/18 1700) 132 Feet (ft) (x 2) (10/04/18 1500)   Assistive Device: Walker, rolling (10/03/18 1700) Walker, rolling (10/04/18 1500)       OT Initial OT Most Recent                                               ST Initial ST Most Recent                        Active Problems:    AVN (avascular necrosis of bone) ([facility] (10/3/2018)      OA (osteoarthritis) of hip (10/3/2018)          Condition on discharge :  good  Rehabilitation  potential : Good     Goals in Rehab : Patient to reach maximal rehabilitation potential in functional mobility,ambulation and ADL/ self care skills ; to improve strength and endurance    Expected Length Of Stay : Depending on pace of progress in mobility and self care in PT and OT ,therapies    Discharge Instructions:  Rehabilitation Management/ Medical Management: 1. Devices:Walkers, Type: Rolling Walker  2. Consult:Rehab team including PT, OT,  and . 3. Disposition Rehab-discussed with patient. 4. Thigh-high or knee-high SIMONE's when out of bed. 5. DVT Prophylaxis - aspirin 81mg bid x 30days. Please notify surgeon at [address] if DVT diagnosed. 6. Incentive spirometer Q1H while awake  7. Post op hemorrhagic anemia- monitor. 8. Activity: WBAT LLE, hip precautions. 9. Planned Labs: CBC,BMP  10. Pain Control:   Continue scheduled tylenol, celebrex and  PRN meds. Continue Xtampza 9mg bid. (pt supplied. )  11. Wound Care: May remove Aquacel 1 week post op and replace with Tegaderm and sterile gauze dressing. Keep wound clean and dry and reinforce dressing PRN. Remove staples 12-14 post surgery, when incision appears appropriately closed and apply benzoin and 1/2\" steristrips.    12. In case of complications: Please notify surgeon at [address] if suspect infection, cellulitis, wound dehiscence or instrument failure, and if considering starting antibiotic. Follow [facility]. [facility]        Discharge Medications:    acetaminophen (TYLENOL) tablet 1,000 mg Ordered Dose: 1,000 mg Route: Oral Frequency: EVERY 8 HOURS x 1 week then change to prn.           senna-docusate (PERICOLACE) 8.6-50 mg per tablet 2 Tab Ordered Dose: 2 Tab Route: Oral Frequency: DAILY       Current Discharge Medication List      START taking these medications    Details   celecoxib (CELEBREX) capsule 200 mg Take 1 Cap by mouth every twelve (12) hours every twelve (12) hours. - Oral      aspirin delayed-release 81 mg tablet Take 1 Tab by mouth EVERY 12 HOURS x 30 days then stop. Take with food or milk or full glass of water. cyclobenzaprine (FLEXERIL) 10 mg tablet Take 0.5 Tabs by mouth three (3) times daily. ondansetron (ZOFRAN ODT) 8 mg disintegrating tablet Take 1 Tab by mouth every eight (8) hours as needed for Nausea. CONTINUE these medications which have CHANGED    Details   oxyCODONE IR (ROXICODONE) tablet 5 mg Take 1 Tab by mouth four (4) times daily. Indications: Pain       Associated Diagnoses: AVN (avascular necrosis of bone) ([address]         CONTINUE these medications which have NOT CHANGED    Details   raNITIdine hcl 150 mg capsule Take 150 mg by mouth two (2) times a day. Indications: gastroesophageal reflux disease      celecoxib (CELEBREX) 200 mg capsule Take 200 mg by mouth daily. Indications: OSTEOARTHRITIS  HOLD. HOLD. oxyCODONE ER (XTAMPZA ER) 9 mg capsule Take 9 mg by mouth every twelve (12) hours. Indications: Severe Pain      gabapentin (NEURONTIN) 600 mg tablet Take 600 mg by mouth three (3) times daily. 1 tablet AM and mid-day, 2 tablets at night   Indications: pain      metFORMIN (GLUMETZA ER) 500 mg TG24 24 hour tablet Take  by mouth daily. Indications: PREVENTION OF TYPE 2 DIABETES MELLITUS      pravastatin (PRAVACHOL) 40 mg tablet Take 40 mg by mouth nightly. Indications: hypercholesterolemia      docusate sodium (COLACE) 100 mg capsule Take 100 mg by mouth nightly. Indications: constipation      cyanocobalamin (VITAMIN B-12) 1,000 mcg tablet Take 1,000 mcg by mouth daily. calcium-cholecalciferol, D3, (CALTRATE 600+D) tablet Take 1 Tab by mouth daily. STOP taking these medications       ibuprofen (MOTRIN) 200 mg tablet Comments:   Reason for Stopping:               Discharge time: 35 minutes.     Signed By: Obi Hernandez MD     October 4, 2018

## 2018-10-04 NOTE — PROGRESS NOTES
2018 Post Op day: 1 Day Post-Op Admit Date: 10/3/2018 Admit Diagnosis: Idiopathic aseptic necrosis of femur, left ([address] [M87.052] Subjective: Patient stable. No acute events. Objective:  
Visit Vitals  /78 (BP 1 Location: Right arm, BP Patient Position: At rest)  Pulse 72  Temp 98.7 °F (37.1 °C)  Resp 16  
 Ht 5' 6\" (1.676 m)  Wt 67.6 kg (149 lb)  SpO2 95%  BMI 24.05 kg/m2 Temp (24hrs), Av.3 °F (36.8 °C), Min:97.5 °F (36.4 °C), Max:98.7 °F (37.1 °C) Lab Results Component Value Date/Time HGB 10.9 (L) 10/04/2018 04:19 AM  
 
Extremity Exam 
Dressing clean and dry Tibialis Anterior and Gastroc-Soleus functioning normally left lower extremity Sensation intact to light touch on operative limb Extremity perfused TEDS/SCDS in place No sign of DVT Assessment / Plan : 
WBAT LLE Posterior hip precautions Continue PT/OT Continue current DVT prophylaxis in house. Discharge on ASA BID DIspo-SNF Patient Active Problem List  
Diagnosis Code  Snoring R06.83  
 AVN (avascular necrosis of bone) ([facility]) M87.00  
 OA (osteoarthritis) of hip M16.9 Signed By: Deepali Sher MD  
 
I have reviewed the patients controlled substance prescription history, as maintained in the [place] prescription monitoring program, so that the prescription(s) for a  controlled substance can be given.

## 2018-10-04 NOTE — PROGRESS NOTES
Problem: Self Care Deficits Care Plan (Adult) Goal: *Acute Goals and Plan of Care (Insert Text) GOALS:  
DISCHARGE GOALS (in preparation for going home/rehab):  3 days 1. Mr. Niles Saha will perform one lower body dressing activity with minimal assistance with adaptive equipment to demonstrate improved functional mobility and safety. -GOAL MET 10/4/2018 2. Mr. Niles Saha will perform one lower body bathing activity with supervision with adaptive equipment to demonstrate improved functional mobility and safety. 3.  Mr. Niles Saha will perform toileting/toilet transfer with supervision with adaptive equipment to demonstrate improved functional mobility and safety. 4.  Mr. Niles Saha will perform shower transfer with supervision with adaptive equipment to demonstrate improved functional mobility and safety. 5.  Mr. Niles Saha will state DIANE precautions with two verbal cues to demonstrate improved functional mobility and safety. JOINT CAMP OCCUPATIONAL THERAPY DIANE: Treatment Day: 2nd 10/4/2018 INPATIENT: Hospital Day: 2 [place] / [place] / Product Type: Medicare /  
  
NAME/AGE/GENDER: Prashant Ng is a 74 y.o. male PRIMARY DIAGNOSIS:  Idiopathic aseptic necrosis of femur, left ([facility] [address] [M87.052] Procedure(s) and Anesthesia Type: 
   * LEFT TOTAL HIP ARTHROPLASTY - Spinal (Left) ICD-10: Treatment Diagnosis:  
 · Pain in left hip (M25.552) · Stiffness of Left Hip, Not elsewhere classified (M25.652) ASSESSMENT:  
 
 Mr. Niles Saha is s/p Left DIANE and presents with decreased weight bearing on L LE and decreased independence with functional mobility and activities of daily living. Patient completed shower and dressing as charter below in ADL grid and is ambulating with rolling walker and Contact guard assist.  Patient has met 1/5 goals and plans to for rehab before returning home. OT reviewed hip precautions throughout session and issued long handled sponge for home use. Ended session in recliner, hard working and progressing well. This section established at most recent assessment PROBLEM LIST (Impairments causing functional limitations): 1. Decreased Strength 2. Decreased ADL/Functional Activities 3. Decreased Transfer Abilities 4. Increased Pain 5. Increased Fatigue 6. Decreased Flexibility/Joint Mobility 7. Decreased Knowledge of Precautions INTERVENTIONS PLANNED: (Benefits and precautions of occupational therapy have been discussed with the patient.) 1. Activities of daily living training 2. Adaptive equipment training 3. Balance training 4. Clothing management 5. Donning&doffing training 6. Theraputic activity TREATMENT PLAN: Frequency/Duration: Follow patient 5 times a week to address above goals. Rehabilitation Potential For Stated Goals: Excellent RECOMMENDED REHABILITATION/EQUIPMENT: (at time of discharge pending progress): Continue Skilled Therapy. OCCUPATIONAL PROFILE AND HISTORY:  
History of Present Injury/Illness (Reason for Referral): Pt presents this date s/p (left) DIANE. Past Medical History/Comorbidities:  
Mr. Theresa Sanchez  has a past medical history of Cancer ([address] Chronic pain; Constipation; DDD (degenerative disc disease), lumbar; Diabetes ([address] GERD (gastroesophageal reflux disease); Hypercholesteremia; Kidney stones; Nausea & vomiting; Scoliosis; and Spinal stenosis. He also has no past medical history of Adverse effect of anesthesia; Difficult intubation; Malignant hyperthermia due to anesthesia; or Pseudocholinesterase deficiency. Mr. Theresa Sanchez  has a past surgical history that includes hx hernia repair; hx urological; hx other surgical; and hx other surgical. 
Social History/Living Environment:  
Home Environment: Private residence # Steps to Enter: 7 Rails to Enter: Yes Hand Rails : Right One/Two Story Residence: One story Living Alone: No 
Support Systems: Family member(s) Patient Expects to be Discharged to[de-identified] Rehabilitation facility Current DME Used/Available at Home: [employer] Tub or Shower Type: Shower Prior Level of Function/Work/Activity: 
Indepndent prior. Hx of back issues. Number of Personal Factors/Comorbidities that affect the Plan of Care: Brief history (0):  LOW COMPLEXITY ASSESSMENT OF OCCUPATIONAL PERFORMANCE[de-identified]  
Most Recent Physical Functioning:  
Balance Sitting: Intact Mental Status Neurologic State: Alert Orientation Level: Oriented X4 Cognition: Appropriate decision making Basic ADLs (From Assessment) Complex ADLs (From Assessment) Basic ADL Feeding: Independent Oral Facial Hygiene/Grooming: Stand-by assistance Bathing: Contact guard assistance Type of Bath: Chlorhexidine (CHG), Full, Shower Upper Body Dressing: Setup Lower Body Dressing: Minimum assistance Toileting: Contact guard assistance Grooming/Bathing/Dressing Activities of Daily Living Functional Transfers Bathroom Mobility: Contact guard assistance Toilet Transfer : Contact guard assistance Shower Transfer: Contact guard assistance Bed/Mat Mobility Sit to Stand: Contact guard assistance Bed to Chair: Contact guard assistance Physical Skills Involved: 1. Range of Motion 2. Balance 3. Strength Cognitive Skills Affected (resulting in the inability to perform in a timely and safe manner): 
1. [facility] Psychosocial Skills Affected: 
1. WFL Number of elements that affect the Plan of Care: 1-3:  LOW COMPLEXITY CLINICAL DECISION MAKING:  
[employer] AM-PAC 6 Clicks Daily Activity Inpatient Short Form How much help from another person does the patient currently need. .. Total A Lot A Little None 1. Putting on and taking off regular lower body clothing? [] 1   [x] 2   [] 3   [] 4  
2. Bathing (including washing, rinsing, drying)?    [] 1   [x] 2   [] 3   [] 4  
 3.  Toileting, which includes using toilet, bedpan or urinal?   [] 1   [x] 2   [] 3   [] 4  
4. Putting on and taking off regular upper body clothing? [] 1   [] 2   [] 3   [x] 4  
5. Taking care of personal grooming such as brushing teeth? [] 1   [] 2   [] 3   [x] 4  
6. Eating meals? [] 1   [] 2   [] 3   [x] 4  
© 2007, Trustees of [address] [place] [address] [address] [id number], under license to [employer]. All rights reserved Score:  Initial: 18 Most Recent: X (Date: -- ) Interpretation of Tool:  Represents activities that are increasingly more difficult (i.e. Bed mobility, Transfers, Gait). Score 24 23 22-20 19-15 14-10 9-7 6 Modifier CH CI CJ CK CL CM CN   
 
? Self Care:  
  - CURRENT STATUS: CK - 40%-59% impaired, limited or restricted  - GOAL STATUS: CJ - 20%-39% impaired, limited or restricted  - D/C STATUS:  ---------------To be determined--------------- [place] / [place] / Product Type: Medicare /   
 
Medical Necessity:    
· Skilled intervention continues to be required due to Deficits listed above. Reason for Services/Other Comments: 
· Patient continues to require skilled intervention due to new DIANE. Use of outcome tool(s) and clinical judgement create a POC that gives a: MODERATE COMPLEXITY  
  
 
 
 
TREATMENT:  
(In addition to Assessment/Re-Assessment sessions the following treatments were rendered) Pre-treatment Symptoms/Complaints:   
Pain: Initial:  
Pain Intensity 1: 2  Post Session:  3 Therapeutic Activity: (    13): Therapeutic activities including Bed transfers, Chair transfers and Ambulation on level ground to improve mobility and balance. Required minimal   to promote motor control of bilateral, upper extremity(s), lower extremity(s). Initial evaluation 5 minutes Treatment/Session Assessment:   
 Response to Treatment:  Good, sitting up in recliner. Education: 
[] Home Exercises [x] Fall Precautions [x] Hip Precautions [] Going Home Video 
[] Knee/Hip Prosthesis Review [x] Walker Management/Safety [x] Adaptive Equipment as Needed Interdisciplinary Collaboration:  
o Physical Therapist 
o Occupational Therapist 
o Registered Nurse After treatment position/precautions:  
o Up in chair 
o Bed/Chair-wheels locked 
o Caregiver at bedside 
o Call light within reach 
o RN notified Compliance with Program/Exercises: compliant all of the time. Recommendations/Intent for next treatment session:  Treatment next visit will focus on increasing Mr. Estradas independence with bed mobility, transfers, self care, functional mobility, modalities for pain, and patient education. Total Treatment Duration: OT Patient Time In/Time Out Time In: 0956 Time Out: 1015 Errol Shabazz OT

## 2018-10-04 NOTE — PROGRESS NOTES
10/03/18 2017 Oxygen Therapy O2 Sat (%) 94 % Pulse via Oximetry 71 beats per minute O2 Device Nasal cannula O2 Flow Rate (L/min) 3 l/min Patient placed on continuous sat monitor # 6  and  3 L NC  Monitor history deleted prior to placing on patient. Patient working on IS.

## 2018-10-04 NOTE — PROGRESS NOTES
Problem: Mobility Impaired (Adult and Pediatric) Goal: *Acute Goals and Plan of Care (Insert Text) GOALS (1-4 days): 
(1.)Mr. Ludivina Crisostomo will move from supine to sit and sit to supine  in bed with SUPERVISION. (2.)Mr. Ludivina Crisostomo will transfer from bed to chair and chair to bed with STAND BY ASSIST using the least restrictive device. (3.)Mr. Ludivina Crisostomo will ambulate with STAND BY ASSIST for 200 feet with the least restrictive device. (4.)Mr. Ludivina Crisostomo will ambulate up/down 7 steps with right railing with CONTACT GUARD ASSIST with no device. (5.)Mr. Ludivina Crisostomo will state/observe DIANE precautions with No verbal cues. ________________________________________________________________________________________________ PHYSICAL THERAPY [facility] [place]: Daily Note, AM 10/4/2018 INPATIENT: Hospital Day: 2 [place] / Product Type: Medicare /  
  
NAME/AGE/GENDER: Kary George is a 74 y.o. male PRIMARY DIAGNOSIS:  Idiopathic aseptic necrosis of femur, left ([facility] [M87.052] Procedure(s) and Anesthesia Type: 
   * LEFT TOTAL HIP ARTHROPLASTY - Spinal (Left) ICD-10: Treatment Diagnosis:  
 · Pain in left hip (M25.552) · Stiffness of Left Hip, Not elsewhere classified (M25.652) · Difficulty in walking, Not elsewhere classified (R26.2) ASSESSMENT:  
 
Mr. Ludivina Crisostomo presents with impaired strength & mobility s/p left DIANE. Pt also had decreased stability during out of bed activity. Pt will benefit from follow up therapy to help restore safe function prior to returning home with caregiver. If pt does not have a capable caregiver to assist on hospital DC, this pt will need a post acute rehab stay prior to returning home. He is sitting in the chair upon arrival.  He increase his distance using RW with CGA and no LOB. He will sit up for a while and then come to group this afternoon. This section established at most recent assessment PROBLEM LIST (Impairments causing functional limitations): 1. Decreased Strength 2. Decreased ADL/Functional Activities 3. Decreased Transfer Abilities 4. Decreased Ambulation Ability/Technique 5. Decreased Balance 6. Increased Pain 7. Decreased Activity Tolerance 8. Decreased Knowledge of Precautions 9. Decreased Phillips with Home Exercise Program 
 INTERVENTIONS PLANNED: (Benefits and precautions of physical therapy have been discussed with the patient.) 1. Bed Mobility 2. Gait Training 3. Home Exercise Program (HEP) 4. Therapeutic Exercise/Strengthening 5. Transfer Training 6. Range of Motion: active/assisted/passive 7. Therapeutic Activities 8. Group Therapy TREATMENT PLAN: Frequency/Duration: Follow patient BID for duration of hospital stay to address above goals. Rehabilitation Potential For Stated Goals: Good RECOMMENDED REHABILITATION/EQUIPMENT: (at time of discharge pending progress): Continue Skilled Therapy and Rehab. HISTORY:  
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 The patient was evaluated and examined in the office prior to today and was found to have a history and physical exam consistent with intra-articular pathology of the left hip. Patient complains of anterior groin pain predominately. Pain occurs during activity. Patient has difficulty putting on socks/shoes and has notable pain when getting up from a chair and getting out of a car. Patient does not complain of significant lateral hip pain or radicular pain down the leg. There have been no changes to the patient's orthopedic condition since the last office visit Past Medical History/Comorbidities:  
Mr. Ortega Mills  has a past medical history of Cancer ([facility] Chronic pain; Constipation; DDD (degenerative disc disease), lumbar; Diabetes ([address] GERD (gastroesophageal reflux disease);  Hypercholesteremia; Kidney stones; Nausea & vomiting; Scoliosis; and Spinal stenosis. He also has no past medical history of Adverse effect of anesthesia; Difficult intubation; Malignant hyperthermia due to anesthesia; or Pseudocholinesterase deficiency. Mr. Avel Harada  has a past surgical history that includes hx hernia repair; hx urological; hx other surgical; and hx other surgical. 
Social History/Living Environment:  
Home Environment: Private residence # Steps to Enter: 7 Rails to Enter: Yes Hand Rails : Right One/Two Story Residence: One story Living Alone: No 
Support Systems: Family member(s) Patient Expects to be Discharged to[de-identified] Rehabilitation facility Current DME Used/Available at Home: Arlette Booze Tub or Shower Type: Shower Prior Level of Function/Work/Activity: Pt was functioning with a cane out of the home & a eliza-walker in the home prior to this admission. Number of Personal Factors/Comorbidities that affect the Plan of Care: 3+: HIGH COMPLEXITY EXAMINATION:  
Most Recent Physical Functioning:  
  
  
 
         
 
  
 
  
 
Transfers Sit to Stand: Contact guard assistance Stand to Sit: Contact guard assistance Bed to Chair: Contact guard assistance Weight Bearing Status Left Side Weight Bearing: As tolerated Distance (ft): 70 Feet (ft) Ambulation - Level of Assistance: Contact guard assistance Assistive Device: Walker, rolling Speed/Paola: Delayed Step Length: Left shortened Stance: Left decreased Gait Abnormalities: Antalgic;Decreased step clearance Braces/Orthotics: none Left Hip Cold Type: Cold/ice pack Body Structures Involved: 1. Joints 2. Muscles Body Functions Affected: 1. Sensory/Pain 2. Movement Related Activities and Participation Affected: 1. General Tasks and Demands 2. Mobility Number of elements that affect the Plan of Care: 4+: HIGH COMPLEXITY CLINICAL PRESENTATION:  
Presentation: Stable and uncomplicated: LOW COMPLEXITY CLINICAL DECISION MAKING:  
 [facility] Basic Mobility Inpatient Short Form How much difficulty does the patient currently have. .. Unable A Lot A Little None 1. Turning over in bed (including adjusting bedclothes, sheets and blankets)? [] 1   [] 2   [x] 3   [] 4  
2. Sitting down on and standing up from a chair with arms ( e.g., wheelchair, bedside commode, etc.)   [] 1   [] 2   [x] 3   [] 4  
3. Moving from lying on back to sitting on the side of the bed? [] 1   [] 2   [x] 3   [] 4 How much help from another person does the patient currently need. .. Total A Lot A Little None 4. Moving to and from a bed to a chair (including a wheelchair)? [] 1   [] 2   [x] 3   [] 4  
5. Need to walk in hospital room? [] 1   [] 2   [x] 3   [] 4  
6. Climbing 3-5 steps with a railing? [x] 1   [] 2   [] 3   [] 4  
© 2007, Trustees of [facility] [employer], under license to [employer]. All rights reserved Score:  Initial: 16 Most Recent: X (Date: -- ) Interpretation of Tool:  Represents activities that are increasingly more difficult (i.e. Bed mobility, Transfers, Gait). Score 24 23 22-20 19-15 14-10 9-7 6 Modifier CH CI CJ CK CL CM CN   
 
? Mobility - Walking and Moving Around:  
  - CURRENT STATUS: CK - 40%-59% impaired, limited or restricted  - GOAL STATUS: CJ - 20%-39% impaired, limited or restricted  - D/C STATUS:  ---------------To be determined--------------- [place] / [place] / Product Type: Medicare /   
 
Medical Necessity:    
· Patient is expected to demonstrate progress in strength, balance, coordination and functional technique to decrease assistance required with bed mobility, transfers & gait. Reason for Services/Other Comments: 
· Patient continues to require skilled intervention due to pt not safe with functional mobility.   
Use of outcome tool(s) and clinical judgement create a POC that gives a: Clear prediction of patient's progress: LOW COMPLEXITY  
  
 
 
 
TREATMENT:  
(In addition to Assessment/Re-Assessment sessions the following treatments were rendered) Pre-treatment Symptoms/Complaints:  none Pain: Initial: visual scale Pain Intensity 1: 0 (same after therapy)  Post Session:    
 
Therapeutic Exercise: (15 Minutes):  Exercises per grid below to improve mobility, strength, balance, coordination and dynamic movement of leg - left to improve functional endurance. Required minimal verbal cues to promote proper body alignment and promote proper body mechanics. Progressed repetitions as indicated. Gait Training (15 Minutes):  Gait training to improve and/or restore physical functioning as related to mobility. Ambulated 70 Feet (ft) with Contact guard assistance using a Walker, rolling and minimal   related to their hip position and motion to promote proper body alignment. Date: 
10/3 Date: 
10/4 Date: 
  
ACTIVITY/EXERCISE AM PM AM PM AM PM  
GROUP THERAPY  []  []  []  []  []  [] Ankle Pumps  10 15 Quad Sets  10 15 Gluteal Sets  10 15 Hip ABd/ADduction  10 15 Straight Leg Raises  --      
Knee Slides  10 15 Short Arc Quads  10 15 [address]  10 Chair Slides B = bilateral; AA = active assistive; A = active; P = passive Treatment/Session Assessment:   
 Response to Treatment:  Tolerated gait and TH exercises. Education: 
[x] Home Exercises [x] Fall Precautions [x] Hip Precautions [] D/C Instruction Review 
[] Knee/Hip Prosthesis Review [x] Walker Management/Safety [] Adaptive Equipment as Needed Interdisciplinary Collaboration:  
o Registered Nurse 
o Certified Nursing Assistant/Patient Care Technician After treatment position/precautions:  
o Up in chair 
o Caregiver at bedside 
o Call light within reach 
o RN notified 
o Family at bedside Compliance with Program/Exercises: Will assess as treatment progresses. Recommendations/Intent for next treatment session:  Treatment next visit will focus on increasing Mr. Escalante's independence with bed mobility, transfers, gait training, strength/ROM exercises, modalities for pain, and patient education. Total Treatment Duration: PT Patient Time In/Time Out Time In: 0900 Time Out: 0930 Guerita Campoverde, PTA

## 2018-10-05 VITALS
RESPIRATION RATE: 20 BRPM | SYSTOLIC BLOOD PRESSURE: 154 MMHG | WEIGHT: 149 LBS | HEIGHT: 66 IN | OXYGEN SATURATION: 93 % | DIASTOLIC BLOOD PRESSURE: 82 MMHG | TEMPERATURE: 98.6 F | HEART RATE: 93 BPM | BODY MASS INDEX: 23.95 KG/M2

## 2018-10-05 LAB
HGB BLD-MCNC: 11.2 G/DL (ref 13.6–17.2)
MM INDURATION POC: 0 MM (ref 0–5)
PPD POC: 0 NEGATIVE

## 2018-10-05 PROCEDURE — 85018 HEMOGLOBIN: CPT

## 2018-10-05 PROCEDURE — 36415 COLL VENOUS BLD VENIPUNCTURE: CPT

## 2018-10-05 PROCEDURE — 97116 GAIT TRAINING THERAPY: CPT

## 2018-10-05 PROCEDURE — 97150 GROUP THERAPEUTIC PROCEDURES: CPT

## 2018-10-05 PROCEDURE — 99239 HOSP IP/OBS DSCHRG MGMT >30: CPT | Performed by: PHYSICAL MEDICINE & REHABILITATION

## 2018-10-05 PROCEDURE — 74011250637 HC RX REV CODE- 250/637: Performed by: ORTHOPAEDIC SURGERY

## 2018-10-05 RX ADMIN — CELECOXIB 200 MG: 200 CAPSULE ORAL at 07:34

## 2018-10-05 RX ADMIN — CYANOCOBALAMIN TAB 1000 MCG 1000 MCG: 1000 TAB at 07:35

## 2018-10-05 RX ADMIN — ASPIRIN 81 MG: 81 TABLET, COATED ORAL at 07:34

## 2018-10-05 RX ADMIN — OXYCODONE HYDROCHLORIDE 5 MG: 5 TABLET ORAL at 11:59

## 2018-10-05 RX ADMIN — OXYCODONE HYDROCHLORIDE 5 MG: 5 TABLET ORAL at 07:33

## 2018-10-05 RX ADMIN — GABAPENTIN 600 MG: 300 CAPSULE ORAL at 07:33

## 2018-10-05 RX ADMIN — METFORMIN HYDROCHLORIDE 500 MG: 500 TABLET ORAL at 07:34

## 2018-10-05 RX ADMIN — Medication 1 AMPULE: at 07:32

## 2018-10-05 RX ADMIN — CALCIUM CARBONATE-VITAMIN D TAB 500 MG-200 UNIT 1 TABLET: 500-200 TAB at 07:34

## 2018-10-05 RX ADMIN — CYCLOBENZAPRINE HYDROCHLORIDE 5 MG: 10 TABLET, FILM COATED ORAL at 07:34

## 2018-10-05 RX ADMIN — ACETAMINOPHEN 1000 MG: 500 TABLET, FILM COATED ORAL at 11:59

## 2018-10-05 NOTE — PROGRESS NOTES
TRANSFER - OUT REPORT: 
 
Verbal report given to [address] on [address] [place]  being transferred to [facility] for routine progression of care Report consisted of patients Situation, Background, Assessment and  
Recommendations(SBAR). Information from the following report(s) SBAR, Kardex, OR Summary, Procedure Summary, Intake/Output, MAR and Accordion was reviewed with the receiving nurse. Lines:    
 
Opportunity for questions and clarification was provided. Patient transported with: 
Family via vehicle

## 2018-10-05 NOTE — DISCHARGE SUMMARY
REHABILITATION DISCHARGE SUMMARY     Patient: Michael Garcia MRN: [id number]  SSN: [id number]    YOB: 1945  Age: 74 y.o. Sex: male      Date: 10/5/2018  Admit Date: 10/3/2018  Discharge Date: 10/5/2018    Admitting Physician: Radhika Chan MD   Primary Care Physician: Not On File Bsh[facility]     Admission Condition: good    Chief Complaint : Gait dysfunction secondary to below. Admit Diagnosis: Idiopathic aseptic necrosis of femur, left ([address] [M87.052]  left  total hip arthroplasty   10/3/2018  Pain  DVT risk  Post op hemorrhagic anemia  DDD  DM  Acute Rehab Dx:  Gait impairment  Debility    Mobility and ambulation deficits  Self Care/ADL deficits       Michael Garcia had painful left hip due to severe DJD, AVN that has been refractory to conservative management. The symptoms were aggravated by weight bearing, walking and activity, limiting daily function. Patient underwent a left DIANE per Dr. Radhika Chan MD on 10/3/2018. The patient's ayaz operative course has been uneventful. Patient is to be WBAT LLE. Hip precautions are to be followed strictly. Patient is starting to work with acute PT, OT, standing, taking steps with minimum assistance so far. Patient still shows significant functional deficits due to right hip pain, decreased ROM and poor strength. Michael Garcia is seen and examined with ambulation using a eliza-walker for walking prior to admission, but limited by hip pain.     O.K.to transfer to rehab today.    Rehabiitation Course:     Home Architecture: Home Situation  Home Environment: Private residence (10/04/18 0900)  # Steps to Enter: 7 (10/03/18 1700)  Rails to Enter: Yes (10/03/18 1700)  Hand Rails : Right (10/03/18 1700)  One/Two Story Residence: One story (10/04/18 0900)  Living Alone: No (10/04/18 0900)  Support Systems: Family member(s) (10/04/18 0900)  Patient Expects to be Discharged to[de-identified] Rehabilitation facility (10/04/18 0900)  Current DME Used/Available at Home: Veronica Rivas (10/04/18 0900)  Tub or Shower Type: Shower (10/03/18 1700)     Past Medical History:   Diagnosis Date    Cancer ([facility]     bladder    Chronic pain     back, hip    Constipation     DDD (degenerative disc disease), lumbar     Diabetes ([facility] [address]     pt reports borderline-does not check FBS    GERD (gastroesophageal reflux disease)     controlled with med    Hypercholesteremia     Kidney stones     Nausea & vomiting     request preop med    Scoliosis     Spinal stenosis       Past Surgical History:   Procedure Laterality Date    HX HERNIA REPAIR      HX OTHER SURGICAL      pilonidal cyst removal    HX OTHER SURGICAL      facial reconstruction    HX UROLOGICAL      bladder      History reviewed. No pertinent family history. Social History   Substance Use Topics    Smoking status: Former Smoker    Smokeless tobacco: Never Used    Alcohol use No       No Known Allergies    Prior to Admission medications    Medication Sig Start Date End Date Taking? Authorizing Provider   HYDROcodone-acetaminophen (NORCO)  mg tablet Take 1 Tab by mouth three (3) times daily. Max Daily Amount: 3 Tabs. Indications: Pain 10/4/18  Yes Mode Weston MD   acetaminophen (TYLENOL) 500 mg tablet Take 2 Tabs by mouth every six (6) hours. 10/4/18  Yes Mode Weston MD   aspirin delayed-release 81 mg tablet Take 1 Tab by mouth every twelve (12) hours every twelve (12) hours. 10/4/18  Yes Mode Weston MD   cyclobenzaprine (FLEXERIL) 10 mg tablet Take 0.5 Tabs by mouth three (3) times daily. 10/4/18  Yes Mode Weston MD   ondansetron (ZOFRAN ODT) 8 mg disintegrating tablet Take 1 Tab by mouth every eight (8) hours as needed for Nausea. 10/4/18  Yes Mode Weston MD   senna-docusate (PERICOLACE) 8.6-50 mg per tablet Take 2 Tabs by mouth daily. 10/4/18  Yes Mode Weston MD   raNITIdine hcl 150 mg capsule Take 150 mg by mouth two (2) times a day.  Indications: gastroesophageal reflux disease   Yes Historical Provider celecoxib (CELEBREX) 200 mg capsule Take 200 mg by mouth daily. Indications: OSTEOARTHRITIS   Yes Historical Provider   oxyCODONE ER [facility]) 9 mg capsule Take 9 mg by mouth every twelve (12) hours. Indications: Severe Pain   Yes Historical Provider   gabapentin (NEURONTIN) 600 mg tablet Take 600 mg by mouth three (3) times daily. 1 tablet AM and mid-day, 2 tablets at night   Indications: pain   Yes Historical Provider   metFORMIN (GLUMETZA ER) 500 mg TG24 24 hour tablet Take  by mouth daily. Indications: PREVENTION OF TYPE 2 DIABETES MELLITUS   Yes Historical Provider   pravastatin (PRAVACHOL) 40 mg tablet Take 40 mg by mouth nightly. Indications: hypercholesterolemia   Yes Historical Provider   docusate sodium (COLACE) 100 mg capsule Take 100 mg by mouth nightly. Indications: constipation   Yes Historical Provider   ibuprofen (MOTRIN) 200 mg tablet Take  by mouth every six (6) hours as needed for Pain. Stop 5 days prior to surgery per anesthesia guidelines. Historical Provider   cyanocobalamin (VITAMIN B-12) 1,000 mcg tablet Take 1,000 mcg by mouth daily. Historical Provider   calcium-cholecalciferol, D3, (CALTRATE 600+D) tablet Take 1 Tab by mouth daily.     Historical Provider       Current Medications:  Current Facility-Administered Medications   Medication Dose Route Frequency Provider Last Rate Last Dose    calcium-vitamin D (OS-FAUSTO) 500 mg-200 unit tablet  1 Tab Oral DAILY Thea Blum MD   1 Tab at 10/05/18 0734    cyanocobalamin tablet 1,000 mcg  1,000 mcg Oral DAILY Thea Blum MD   1,000 mcg at 10/05/18 0735    gabapentin (NEURONTIN) capsule 600 mg  600 mg Oral BID Thea Blum MD   600 mg at 10/05/18 9823    metFORMIN (GLUCOPHAGE) tablet 500 mg  500 mg Oral DAILY Thea Blum MD   500 mg at 10/05/18 0734    pravastatin (PRAVACHOL) tablet 40 mg  40 mg Oral QHS Thea Blum MD   40 mg at 10/03/18 8022    alcohol 62% (NOZIN) nasal  1 Ampule  1 Ampule Topical Q12H Olesya De Jesus MD   1 Ampule at 10/05/18 0732    sodium chloride (NS) flush 5-10 mL  5-10 mL IntraVENous PRN Olesya De Jesus MD        acetaminophen (TYLENOL) tablet 1,000 mg  1,000 mg Oral Q6H Olesya De Jesus MD   1,000 mg at 10/04/18 1830    celecoxib (CELEBREX) capsule 200 mg  200 mg Oral Q12H Olesya De Jesus MD   200 mg at 10/05/18 0734    naloxone (NARCAN) injection 0.2-0.4 mg  0.2-0.4 mg IntraVENous Q10MIN PRN Olesya De Jesus MD        diphenhydrAMINE (BENADRYL) capsule 25 mg  25 mg Oral Q4H PRN Olesya De Jesus MD        aspirin delayed-release tablet 81 mg  81 mg Oral Q12H Olesya De Jesus MD   81 mg at 10/05/18 0734    oxyCODONE IR (ROXICODONE) tablet 5 mg  5 mg Oral Q3H PRN Olesya De Jesus MD   5 mg at 10/05/18 0733    HYDROmorphone (PF) (DILAUDID) injection 1 mg  1 mg IntraVENous Q3H PRN Olesya De Jesus MD        senna-docusate (PERICOLACE) 8.6-50 mg per tablet 2 Tab  2 Tab Oral DAILY Olesya De Jesus MD   Stopped at 10/05/18 0734    cyclobenzaprine (FLEXERIL) tablet 5 mg  5 mg Oral TID Olesya De Jesus MD   5 mg at 10/05/18 0734    ondansetron (ZOFRAN ODT) tablet 8 mg  8 mg Oral Q8H PRN Olesya De Jesus MD        oxyCODONE ER [facility] ER) capsule 9 mg (Patient Supplied)  9 mg Oral Q12H Olesya De Jesus MD   9 mg at 10/05/18 0830    gabapentin (NEURONTIN) capsule 1,200 mg  1,200 mg Oral QHS Olesya De Jesus MD   1,200 mg at 10/04/18 2041        Review of Systems: Denies chest pain, shortness of breath, cough, headache, visual problems, abdominal pain, dysurea, calf pain. Pertinent positives are as noted in the medical records and unremarkable otherwise. Vital Signs:   Patient Vitals for the past 8 hrs:   BP Temp Pulse Resp SpO2   10/05/18 0801 154/82 98.6 °F (37 °C) 93 20 93 %   10/05/18 0433 165/75 97.9 °F (36.6 °C) 87 16 93 %        Physical Exam:   General: Alert and age appropriately oriented. No acute cardio respiratory distress. HEENT: Normocephalic. Oral mucosa moist without cyanosis. Lungs: Clear to auscultation  bilaterally. Respiration even and unlabored   Heart: Regular rate and rhythm, S1, S2   No  murmurs, clicks, rub or gallops   Abdomen: Soft, non-tender, nondistended. Bowel sounds present   Genitourinary: defered   Neuromuscular:      Grossly no focal neurological deficits noted. L Ankle dorsiflexion 5/5   L Ankle plantarflexion 5/5  R Ankle dorsiflexion 5/5   R Ankle plantarflexion 5/5  No sensory deficits. Skin/extremity: No rashes, no erythema. Calves soft. Wound covered.      Skin Incision(s)/Wound(s):        Lab Review:   Recent Results (from the past 72 hour(s))   TYPE & SCREEN    Collection Time: 10/03/18 10:42 AM   Result Value Ref Range    Crossmatch Expiration 10/06/2018     ABO/Rh(D) Irma Sadler POSITIVE     Antibody screen NEG    GLUCOSE, POC    Collection Time: 10/03/18 10:48 AM   Result Value Ref Range    Glucose (POC) 121 (H) 65 - 100 mg/dL   HEMOGLOBIN    Collection Time: 10/03/18  8:07 PM   Result Value Ref Range    HGB 11.3 (L) 13.6 - 17.2 g/dL   HEMOGLOBIN    Collection Time: 10/04/18  4:19 AM   Result Value Ref Range    HGB 10.9 (L) 13.6 - 51.9 g/dL   METABOLIC PANEL, BASIC    Collection Time: 10/04/18  4:19 AM   Result Value Ref Range    Sodium 142 136 - 145 mmol/L    Potassium 3.9 3.5 - 5.1 mmol/L    Chloride 104 98 - 107 mmol/L    CO2 32 21 - 32 mmol/L    Anion gap 6 mmol/L    Glucose 116 (H) 65 - 100 mg/dL    BUN 12 8 - 23 MG/DL    Creatinine 0.95 0.8 - 1.5 MG/DL    GFR est AA >60 >60 ml/min/1.73m2    GFR est non-AA >60 ml/min/1.73m2    Calcium 8.2 (L) 8.3 - 10.4 MG/DL   HEMOGLOBIN    Collection Time: 10/05/18  4:20 AM   Result Value Ref Range    HGB 11.2 (L) 13.6 - 17.2 g/dL       PT Initial  PT Most Recent   AROM: Generally decreased, functional (right LE) (10/03/18 1700) Generally decreased, functional (right LE) (10/03/18 1700)         Strength: Generally decreased, functional (right LE) (10/03/18 1700) Generally decreased, functional (right LE) (10/03/18 1700) Coordination: Generally decreased, functional (right LE) (10/03/18 1700) Generally decreased, functional (right LE) (10/03/18 1700)                     Distance (ft): 60 Feet (ft) (10/03/18 1700) 132 Feet (ft) (x 2) (10/04/18 1500)   Assistive Device: Walker, rolling (10/03/18 1700) Walker, rolling (10/04/18 1500)       OT Initial OT Most Recent                                               ST Initial ST Most Recent                        Active Problems:    AVN (avascular necrosis of bone) ([facility] [address] (10/3/2018)      OA (osteoarthritis) of hip (10/3/2018)          Condition on discharge :  good  Rehabilitation  potential : Good     Goals in Rehab : Patient to reach maximal rehabilitation potential in functional mobility,ambulation and ADL/ self care skills ; to improve strength and endurance    Expected Length Of Stay : Depending on pace of progress in mobility and self care in PT and OT ,therapies    Discharge Instructions:  Rehabilitation Management/ Medical Management: 1. Devices:Walkers, Type: Rolling Walker  2. Consult:Rehab team including PT, OT,  and . 3. Disposition Rehab-discussed with patient. 4. Thigh-high or knee-high SIMONE's when out of bed. 5. DVT Prophylaxis - aspirin 81mg bid x 30days. Please notify surgeon at [address] [place] [address] if DVT diagnosed. 6. Incentive spirometer Q1H while awake  7. Post op hemorrhagic anemia- monitor. 8. Activity: WBAT LLE, hip precautions. 9. Planned Labs: CBC,BMP  10. Pain Control:   Continue scheduled tylenol, celebrex and  PRN meds. Continue Xtampza 9mg bid. (pt supplied. )  11. Wound Care: May remove Aquacel 1 week post op and replace with Tegaderm and sterile gauze dressing. Keep wound clean and dry and reinforce dressing PRN. Remove staples 12-14 post surgery, when incision appears appropriately closed and apply benzoin and 1/2\" steristrips.    12. In case of complications: Please notify surgeon at [address] [place] [address] if suspect infection, cellulitis, wound dehiscence or instrument failure, and if considering starting antibiotic. Follow up with ORTHO per instructions. Aneeshiortentie 2        Discharge Medications:    acetaminophen (TYLENOL) tablet 1,000 mg Ordered Dose: 1,000 mg Route: Oral Frequency: EVERY 8 HOURS x 1 week then change to prn.           senna-docusate (PERICOLACE) 8.6-50 mg per tablet 2 Tab Ordered Dose: 2 Tab Route: Oral Frequency: DAILY       Current Discharge Medication List      START taking these medications    Details   celecoxib (CELEBREX) capsule 200 mg Take 1 Cap by mouth every twelve (12) hours every twelve (12) hours. - Oral      aspirin delayed-release 81 mg tablet Take 1 Tab by mouth EVERY 12 HOURS x 30 days then stop. Take with food or milk or full glass of water. cyclobenzaprine (FLEXERIL) 10 mg tablet Take 0.5 Tabs by mouth three (3) times daily. ondansetron (ZOFRAN ODT) 8 mg disintegrating tablet Take 1 Tab by mouth every eight (8) hours as needed for Nausea. CONTINUE these medications which have CHANGED    Details   oxyCODONE IR (ROXICODONE) tablet 5 mg Take 1 Tab by mouth four (4) times daily. Indications: Pain       Associated Diagnoses: AVN (avascular necrosis of bone) ([address]         CONTINUE these medications which have NOT CHANGED    Details   raNITIdine hcl 150 mg capsule Take 150 mg by mouth two (2) times a day. Indications: gastroesophageal reflux disease      celecoxib (CELEBREX) 200 mg capsule Take 200 mg by mouth daily. Indications: OSTEOARTHRITIS  HOLD. HOLD. oxyCODONE ER (XTAMPZA ER) 9 mg capsule Take 9 mg by mouth every twelve (12) hours. Indications: Severe Pain      gabapentin (NEURONTIN) 600 mg tablet Take 600 mg by mouth three (3) times daily. 1 tablet AM and mid-day, 2 tablets at night   Indications: pain      metFORMIN (GLUMETZA ER) 500 mg TG24 24 hour tablet Take  by mouth daily.  Indications: PREVENTION OF TYPE 2 DIABETES MELLITUS      pravastatin (PRAVACHOL) 40 mg tablet Take 40 mg by mouth nightly. Indications: hypercholesterolemia      docusate sodium (COLACE) 100 mg capsule Take 100 mg by mouth nightly. Indications: constipation      cyanocobalamin (VITAMIN B-12) 1,000 mcg tablet Take 1,000 mcg by mouth daily. calcium-cholecalciferol, D3, (CALTRATE 600+D) tablet Take 1 Tab by mouth daily. STOP taking these medications       ibuprofen (MOTRIN) 200 mg tablet Comments:   Reason for Stopping:               Discharge time: 35 minutes.     Signed By: Radha Ayala MD     October 5, 2018

## 2018-10-05 NOTE — PROGRESS NOTES
I rec'd call from Papo Templeton at [facility]. Mr. Jace Moon has been approved for admission to their Acute rehab today. He will go to room  116. Pt/family updated. Lily Chance

## 2018-10-05 NOTE — PROGRESS NOTES
Pt up in the recliner and has been a sleep since shift change with daughter at the bedside. No c/o pian verbalized. Dressing to left hip clean dry and intact. NV status WNL's  No noted distress. Bed in low locked position and call light within reach

## 2018-10-05 NOTE — PROGRESS NOTES
Patient in recliner. Dressing dry and intact. Oxycodone 5 mg given PO for pain of 5/10. Neurovascular status WDL. Palpable pulses. Positive dorsiflexion and plantar flexion. Instructed not to get up by themselves and call for assistance or any needs. Patient verbalized understanding. Call bell within reach. Side rails up x3. Bed low and locked. No distress noted. Family member at bedside.

## 2018-10-05 NOTE — PROGRESS NOTES
2018 Post Op day: 2 Days Post-Op Admit Date: 10/3/2018 Admit Diagnosis: Idiopathic aseptic necrosis of femur, left ([address] [M87.052] Subjective: Patient stable. No acute events. Objective:  
Visit Vitals  /75 (BP 1 Location: Right arm, BP Patient Position: At rest)  Pulse 87  Temp 97.9 °F (36.6 °C)  Resp 16  
 Ht 5' 6\" (1.676 m)  Wt 67.6 kg (149 lb)  SpO2 93%  BMI 24.05 kg/m2 Temp (24hrs), Av.2 °F (36.8 °C), Min:97.4 °F (36.3 °C), Max:99.8 °F (37.7 °C) Lab Results Component Value Date/Time HGB 11.2 (L) 10/05/2018 04:20 AM  
 
Extremity Exam 
Dressing clean and dry Tibialis Anterior and Gastroc-Soleus functioning normally left lower extremity Sensation intact to light touch on operative limb Extremity perfused TEDS/SCDS in place No sign of DVT Assessment / Plan : 
WBAT LLE Posterior hip precautions Continue PT/OT Continue current DVT prophylaxis in house. Discharge on ASA BID DIspo-SNF Patient Active Problem List  
Diagnosis Code  Snoring R06.83  
 AVN (avascular necrosis of bone) ([facility]) M87.00  
 OA (osteoarthritis) of hip M16.9 Signed By: Carlos Paula MD  
 
I have reviewed the patients controlled substance prescription history, as maintained in the [place] prescription monitoring program, so that the prescription(s) for a  controlled substance can be given.

## 2018-10-05 NOTE — PROGRESS NOTES
Problem: Mobility Impaired (Adult and Pediatric) Goal: *Acute Goals and Plan of Care (Insert Text) GOALS (1-4 days): 
(1.)Mr. Chantel Kraus will move from supine to sit and sit to supine  in bed with SUPERVISION. (2.)Mr. Chantel Kraus will transfer from bed to chair and chair to bed with STAND BY ASSIST using the least restrictive device. 10/5 
(3.)Mr. Chanetl Kraus will ambulate with STAND BY ASSIST for 200 feet with the least restrictive device. 10/5 
(4.)Mr. Chantel Kraus will ambulate up/down 7 steps with right railing with CONTACT GUARD ASSIST with no device. Going to rehab (5.)Mr. Chantel Kraus will state/observe TAJ precautions with No verbal cues. ________________________________________________________________________________________________ PHYSICAL THERAPY [facility]: Daily Note, AM 10/5/2018 INPATIENT: Hospital Day: 3 [place] / [place] / Product Type: Medicare /  
  
NAME/AGE/GENDER: Lamar Sargent is a 74 y.o. male PRIMARY DIAGNOSIS:  Idiopathic aseptic necrosis of femur, [facility] ([facility] [M87.052] Procedure(s) and Anesthesia Type: 
   * LEFT TOTAL HIP ARTHROPLASTY - Spinal (Left) ICD-10: Treatment Diagnosis:  
 · Pain in left hip (M25.552) · Stiffness of Left Hip, Not elsewhere classified (M25.652) · Difficulty in walking, Not elsewhere classified (R26.2) ASSESSMENT:  
 
Mr. Chantel Kraus presents with impaired strength & mobility s/p left TAJ. Pt also had decreased stability during out of bed activity. Pt will benefit from follow up therapy to help restore safe function prior to returning home with caregiver. If pt does not have a capable caregiver to assist on hospital DC, this pt will need a post acute rehab stay prior to returning home. He has made good progress with gait and exercises. He is going to rehab today. This section established at most recent assessment PROBLEM LIST (Impairments causing functional limitations): 1. Decreased Strength 2. Decreased ADL/Functional Activities 3. Decreased Transfer Abilities 4. Decreased Ambulation Ability/Technique 5. Decreased Balance 6. Increased Pain 7. Decreased Activity Tolerance 8. Decreased Knowledge of Precautions 9. Decreased [place] with Home Exercise Program 
 INTERVENTIONS PLANNED: (Benefits and precautions of physical therapy have been discussed with the patient.) 1. Bed Mobility 2. Gait Training 3. Home Exercise Program (HEP) 4. Therapeutic Exercise/Strengthening 5. Transfer Training 6. Range of Motion: active/assisted/passive 7. Therapeutic Activities 8. Group Therapy TREATMENT PLAN: Frequency/Duration: Follow patient BID for duration of hospital stay to address above goals. Rehabilitation Potential For Stated Goals: Good RECOMMENDED REHABILITATION/EQUIPMENT: (at time of discharge pending progress): Continue Skilled Therapy and Rehab. HISTORY:  
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 The patient was evaluated and examined in the office prior to today and was found to have a history and physical exam consistent with intra-articular pathology of the left hip. Patient complains of anterior groin pain predominately. Pain occurs during activity. Patient has difficulty putting on socks/shoes and has notable pain when getting up from a chair and getting out of a car. Patient does not complain of significant lateral hip pain or radicular pain down the leg. There have been no changes to the patient's orthopedic condition since the last office visit Past Medical History/Comorbidities:  
Mr. Fina Lee  has a past medical history of Cancer ([facility] [address] Chronic pain; Constipation; DDD (degenerative disc disease), lumbar; Diabetes ([address] GERD (gastroesophageal reflux disease); Hypercholesteremia; Kidney stones; Nausea & vomiting; Scoliosis; and Spinal stenosis.  He also has no past medical history of Adverse effect of anesthesia; Difficult intubation; Malignant hyperthermia due to anesthesia; or Pseudocholinesterase deficiency. Mr. Flash Rodríguez  has a past surgical history that includes hx hernia repair; hx urological; hx other surgical; and hx other surgical. 
Social History/Living Environment:  
Home Environment: Private residence # Steps to Enter: 7 Rails to Enter: Yes Hand Rails : Right One/Two Story Residence: One story Living Alone: No 
Support Systems: Family member(s) Patient Expects to be Discharged to[de-identified] Rehabilitation facility Current DME Used/Available at Home: Veronica  Tub or Shower Type: Shower Prior Level of Function/Work/Activity: Pt was functioning with a cane out of the home & a eliza-walker in the home prior to this admission. Number of Personal Factors/Comorbidities that affect the Plan of Care: 3+: HIGH COMPLEXITY EXAMINATION:  
Most Recent Physical Functioning:  
  
  
 
         
 
  
 
  
 
Transfers Sit to Stand: Contact guard assistance Stand to Sit: Contact guard assistance Bed to Chair: Contact guard assistance Weight Bearing Status Left Side Weight Bearing: As tolerated Distance (ft): 132 Feet (ft) (x 2) Ambulation - Level of Assistance: Contact guard assistance Assistive Device: Walker, rolling Speed/Paola: Delayed Step Length: Right shortened Stance: Left decreased Gait Abnormalities: Decreased step clearance Braces/Orthotics: none Body Structures Involved: 1. Joints 2. Muscles Body Functions Affected: 1. Sensory/Pain 2. Movement Related Activities and Participation Affected: 1. General Tasks and Demands 2. Mobility Number of elements that affect the Plan of Care: 4+: HIGH COMPLEXITY CLINICAL PRESENTATION:  
Presentation: Stable and uncomplicated: LOW COMPLEXITY CLINICAL DECISION MAKING:  
[employer] AM-[employer] 6 Clicks Basic Mobility Inpatient Short Form How much difficulty does the patient currently have. .. Unable A Lot A Little None 1. Turning over in bed (including adjusting bedclothes, sheets and blankets)? [] 1   [] 2   [x] 3   [] 4  
2. Sitting down on and standing up from a chair with arms ( e.g., wheelchair, bedside commode, etc.)   [] 1   [] 2   [x] 3   [] 4  
3. Moving from lying on back to sitting on the side of the bed? [] 1   [] 2   [x] 3   [] 4 How much help from another person does the patient currently need. .. Total A Lot A Little None 4. Moving to and from a bed to a chair (including a wheelchair)? [] 1   [] 2   [x] 3   [] 4  
5. Need to walk in hospital room? [] 1   [] 2   [x] 3   [] 4  
6. Climbing 3-5 steps with a railing? [x] 1   [] 2   [] 3   [] 4  
© 2007, Trustees of [facility] [employer], under license to [employer]. All rights reserved Score:  Initial: 16 Most Recent: X (Date: -- ) Interpretation of Tool:  Represents activities that are increasingly more difficult (i.e. Bed mobility, Transfers, Gait). Score 24 23 22-20 19-15 14-10 9-7 6 Modifier CH CI CJ CK CL CM CN   
 
? Mobility - Walking and Moving Around:  
  - CURRENT STATUS: CK - 40%-59% impaired, limited or restricted  - GOAL STATUS: CJ - 20%-39% impaired, limited or restricted  - D/C STATUS:  ---------------To be determined--------------- [place] / [place] / Product Type: Medicare /   
 
Medical Necessity:    
· Patient is expected to demonstrate progress in strength, balance, coordination and functional technique to decrease assistance required with bed mobility, transfers & gait. Reason for Services/Other Comments: 
· Patient continues to require skilled intervention due to pt not safe with functional mobility.   
Use of outcome tool(s) and clinical judgement create a POC that gives a: Clear prediction of patient's progress: LOW COMPLEXITY  
  
 
 
 
TREATMENT:  
 (In addition to Assessment/Re-Assessment sessions the following treatments were rendered) Pre-treatment Symptoms/Complaints:  none Pain: Initial: visual scale Pain Intensity 1: 0  Post Session:    
 
Therapeutic Exercise: (45 Minutes (group therapy)):  Exercises per grid below to improve mobility, strength, balance, coordination and dynamic movement of leg - left to improve functional endurance. Required minimal verbal cues to promote proper body alignment and promote proper body mechanics. Progressed repetitions as indicated. Gait Training (15 Minutes):  Gait training to improve and/or restore physical functioning as related to mobility. Ambulated 132 Feet (ft) (x 2) with Contact guard assistance using a Walker, rolling and minimal   related to their hip position and motion to promote proper body alignment. Date: 
10/3 Date: 
10/4 Date: 
10/5 ACTIVITY/EXERCISE AM PM AM PM AM PM  
GROUP THERAPY  []  []  []  [x]  [x]  [] Ankle Pumps  10 15 15 20 Quad Sets  10 15 15 20 Gluteal Sets  10 15 15 20 Hip ABd/ADduction  10 15 15 20 Straight Leg Raises  --      
Knee Slides  10 15 15 20 Short Arc Quads  10 15 15 20 [address]  10  15 20 Chair Slides B = bilateral; AA = active assistive; A = active; P = passive Treatment/Session Assessment:   
 Response to Treatment:  Tolerated gait and TH exercises well Education: 
[x] Home Exercises [x] Fall Precautions [x] Hip Precautions [] D/C Instruction Review 
[] Knee/Hip Prosthesis Review [x] Walker Management/Safety [] Adaptive Equipment as Needed Interdisciplinary Collaboration:  
o Registered Nurse 
o Certified Nursing Assistant/Patient Care Technician After treatment position/precautions:  
o Up in chair 
o Caregiver at bedside 
o Call light within reach 
o RN notified 
o Family at bedside Compliance with Program/Exercises: Will assess as treatment progresses. Recommendations/Intent for next treatment session:  Treatment next visit will focus on increasing Mr. Escalante's independence with bed mobility, transfers, gait training, strength/ROM exercises, modalities for pain, and patient education. Total Treatment Duration: PT Patient Time In/Time Out Time In: 1030 Time Out: 1130 Guerita Campoverde, PTA

## (undated) DEVICE — HEWSON SUTURE RETRIEVER: Brand: HEWSON SUTURE RETRIEVER

## (undated) DEVICE — NEEDLE HYPO 21GA L1.5IN INTRAMUSCULAR S STL LATCH BVL UP

## (undated) DEVICE — Z DISCONTINUED USE 2423037 APPLICATOR MEDICATED 3 CC CHLORHEXIDINE GLUC 2% CHLORAPREP

## (undated) DEVICE — SUTURE STRATAFIX SPRL SZ 1 L5IN ABSRB VLT CT-1 L36MM 1/2 SXPD2B401

## (undated) DEVICE — 1840 FOAM BLOCK NEEDLE COUNTER: Brand: DEVON

## (undated) DEVICE — 3M™ STERI-DRAPE™ INSTRUMENT POUCH 1018: Brand: STERI-DRAPE™

## (undated) DEVICE — SOLUTION IRRIG 3000ML 0.9% SOD CHL FLX CONT 0797208] ICU MEDICAL INC]

## (undated) DEVICE — BUTTON SWITCH PENCIL BLADE ELECTRODE, HOLSTER: Brand: EDGE

## (undated) DEVICE — TRAY PREP DRY W/ PREM GLV 2 APPL 6 SPNG 2 UNDPD 1 OVERWRAP

## (undated) DEVICE — SOLUTION IV 1000ML 0.9% SOD CHL

## (undated) DEVICE — SUTURE FIBERWIRE SZ 2 W/ TAPERED NEEDLE BLUE L38IN NONABSORB BLU L26.5MM 1/2 CIRCLE AR7200

## (undated) DEVICE — FAN SPRAY KIT: Brand: PULSAVAC®

## (undated) DEVICE — SYSTEM SKIN CLSR 22CM DERMBND PRINEO

## (undated) DEVICE — TRAY CATH 16F DRN BG LTX -- CONVERT TO ITEM 363158

## (undated) DEVICE — SYR 10ML LUER LOK 1/5ML GRAD --

## (undated) DEVICE — DRAPE,U/SHT,SPLIT,FILM,60X84,STERILE: Brand: MEDLINE

## (undated) DEVICE — REM POLYHESIVE ADULT PATIENT RETURN ELECTRODE: Brand: VALLEYLAB

## (undated) DEVICE — 3M™ STERI-DRAPE™ INCISE DRAPE, XL 1051: Brand: STERI-DRAPE™

## (undated) DEVICE — JELLY LUBRICATING 10GM PREFIL SYR LUBE

## (undated) DEVICE — AMD ANTIMICROBIAL NON-ADHERENT PAD,0.2% POLYHEXAMETHYLENE BIGUANIDE HCI (PHMB): Brand: TELFA

## (undated) DEVICE — T4 HOOD

## (undated) DEVICE — BIPOLAR SEALER 23-112-1 AQM 6.0: Brand: AQUAMANTYS ®

## (undated) DEVICE — GOWN,AURORA,FABRIC-REINFORCED,2XL: Brand: MEDLINE

## (undated) DEVICE — SUTURE MCRYL SZ 2-0 L27IN ABSRB UD CP-1 1 L36MM 1/2 CIR REV Y266H

## (undated) DEVICE — MCLASS OSCILLATING SAW BLADE 19 X 1.27 (0.050") X 90 MM: Brand: MCLASS

## (undated) DEVICE — SOLUTION IV DEXTROSE/SALINE 5%-0.9% 500ML - 500ML

## (undated) DEVICE — STOCKINETTE TUBE 6X48 -- MEDICHOICE

## (undated) DEVICE — DRAPE,HIP,W/POUCHES,STERILE: Brand: MEDLINE

## (undated) DEVICE — 3000CC GUARDIAN II: Brand: GUARDIAN

## (undated) DEVICE — 1010 S-DRAPE TOWEL DRAPE 10/BX: Brand: STERI-DRAPE™

## (undated) DEVICE — CONTAINER,SPECIMEN,O.R.STRL,4.5OZ: Brand: MEDLINE

## (undated) DEVICE — SURGICAL PROCEDURE PACK TOT HIP CDS

## (undated) DEVICE — BLADE ELECTRODE: Brand: VALLEYLAB

## (undated) DEVICE — ABDOMINAL PAD: Brand: DERMACEA

## (undated) DEVICE — SUTURE STRATAFIX SPRL SZ 1 L14IN ABSRB VLT L48CM CTX 1/2 SXPD2B405

## (undated) DEVICE — MEDI-VAC YANKAUER SUCTION HANDLE W/BULBOUS TIP: Brand: CARDINAL HEALTH

## (undated) DEVICE — NEEDLE SPNL 22GA L3.5IN BLK HUB S STL REG WALL FIT STYL W/

## (undated) DEVICE — 2000CC GUARDIAN II: Brand: GUARDIAN

## (undated) DEVICE — AMD ANTIMICROBIAL GAUZE SPONGES,12 PLY USP TYPE VII, 0.2% POLYHEXAMETHYLENE BIGUANIDE HCI (PHMB): Brand: CURITY

## (undated) DEVICE — SUTURE STRATAFIX SZ 3-0 L30CM NONABSORBABLE UD L19MM FS-2 SXMP2B408

## (undated) DEVICE — (D)PREP SKN CHLRAPRP APPL 26ML -- CONVERT TO ITEM 371833